# Patient Record
Sex: FEMALE | Race: OTHER | HISPANIC OR LATINO
[De-identification: names, ages, dates, MRNs, and addresses within clinical notes are randomized per-mention and may not be internally consistent; named-entity substitution may affect disease eponyms.]

---

## 2016-12-31 NOTE — H&P ADULT. - ASSESSMENT
72 y/o female with HTN, dyslipidemia, GERD, OA, obesity, admitted for bilateral lower extremity redness/pain/swelling c/w cellulitis

## 2016-12-31 NOTE — H&P ADULT. - PROBLEM SELECTOR PLAN 1
iv clindamycin 600 mg q8h, pt is allergic to penicillin, consider transition to po abx soon; check leg doppler to r/o DVT (less likely)

## 2016-12-31 NOTE — ED PROVIDER NOTE - OBJECTIVE STATEMENT
71f, pmhx htn, dlp. p/w b/l foot swelling started 3 days ago. yesterday, noted both legs were swollen and became red. no c/p, sob, palps. no f/c, n/v. no h/o similar, no h/o chf.  PMD: Dr. Felisa Buenrostro

## 2016-12-31 NOTE — ED ADULT TRIAGE NOTE - CHIEF COMPLAINT QUOTE
pt c/o reddness to both lower legs that started yesterday. with swelling  pt has sore that's starting on lt lower leg

## 2016-12-31 NOTE — H&P ADULT. - PMH
Essential (primary) hypertension    Gastroesophageal reflux disease without esophagitis    Hyperlipidemia, unspecified hyperlipidemia type    Other type of osteoarthritis, unspecified site

## 2016-12-31 NOTE — H&P ADULT. - HISTORY OF PRESENT ILLNESS
70 y/o female with h/o HTN, OA s/p bilateral TKR, dyslipidemia, GERD, who presented with 2 day h/o bilateral lower leg redness and pain and swelling, denies fever/chill/chest pain/sob. She has two little dogs at home, but denies insect/animal bites or trauma.   In the ED, she was given clindamycin (reports allergy to penicillin). Her PCP is Dr. Felisa Buenrostro

## 2016-12-31 NOTE — ED PROVIDER NOTE - MEDICAL DECISION MAKING DETAILS
3 days of pedal edema, with sudden b/l erythema and swelling yesterday. H+P has features of cellulitis as well as stasis edema. will tx for cellulitis. labs, probnp, cxr. will need admission.

## 2016-12-31 NOTE — H&P ADULT. - EXTREMITIES COMMENTS
bilateral lower leg erythema, increased warmth, soft tissue swelling and tenderness to palpation; no drainage

## 2017-01-01 ENCOUNTER — TRANSCRIPTION ENCOUNTER (OUTPATIENT)
Age: 72
End: 2017-01-01

## 2017-01-01 NOTE — DISCHARGE NOTE ADULT - SECONDARY DIAGNOSIS.
Essential (primary) hypertension Hyperlipidemia, unspecified hyperlipidemia type Gastroesophageal reflux disease without esophagitis

## 2017-01-01 NOTE — DISCHARGE NOTE ADULT - PATIENT PORTAL LINK FT
“You can access the FollowHealth Patient Portal, offered by [facility], by registering with the following website: [email or web address]”

## 2017-01-01 NOTE — DISCHARGE NOTE ADULT - HOSPITAL COURSE
72 y/o female with h/o HTN, OA s/p bilateral TKR, dyslipidemia, GERD, who presented with 2 day h/o bilateral lower leg redness and pain and swelling, denies fever/chill/chest pain/sob. She has two little dogs at home, but denies insect/animal bites or trauma    Hospital Course   Cellulitis of lower extremity, unspecified laterality.  -Clindamycin 600 mg q8h   -leg doppler-__________________________  -House ID _____________________________     Essential (primary) hypertension.   -oprol 50 mg while in hospital.     Other type of osteoarthritis, unspecified site.    tylenol prn; h/o bilateral TKR, stable.       Dispo: 70 y/o female with h/o HTN, OA s/p bilateral TKR, dyslipidemia, GERD, who presented with 2 day h/o bilateral lower leg redness and pain and swelling, denies fever/chill/chest pain/sob. She has two little dogs at home, but denies insect/animal bites or trauma    Hospital Course:    Cellulitis of lower extremity, unspecified laterality.  -Clindamycin 600 mg q8h   -leg doppler-__________________________  -House ID-no significant cellulitis, monitor off abx    Essential (primary) hypertension.   -Toprol 50 mg while in hospital.     Other type of osteoarthritis, unspecified site.   Tylenol prn; h/o bilateral TKR, stable.       Dispo: 72 y/o female with h/o HTN, OA s/p bilateral TKR, dyslipidemia, GERD, who presented with 2 day h/o bilateral lower leg redness and pain and swelling, denies fever/chill/chest pain/sob. She has two little dogs at home, but denies insect/animal bites or trauma    Hospital Course:    Cellulitis of lower extremity, unspecified laterality.  -Clindamycin 600 mg q8h   -leg doppler-negative  -House ID-no significant cellulitis, monitor off abx    Essential (primary) hypertension.   -Toprol 50 mg while in hospital.     Other type of osteoarthritis, unspecified site.   Tylenol prn; h/o bilateral TKR, stable.       Dispo: Pt medically stable for discharge home with PCP follow up

## 2017-01-01 NOTE — DISCHARGE NOTE ADULT - MEDICATION SUMMARY - MEDICATIONS TO TAKE
I will START or STAY ON the medications listed below when I get home from the hospital:    acetaminophen 325 mg oral tablet  -- 2 tab(s) by mouth every 6 hours, As needed, Mild Pain (1 - 3) or fever >101F  -- Indication: For PAIN     Zetia 10 mg oral tablet  -- 1 tab(s) by mouth once a day  -- Indication: For HLD    Bystolic 10 mg oral tablet  -- 1 tab(s) by mouth once a day  -- Indication: For HTN    omeprazole 20 mg oral delayed release capsule  -- 1 cap(s) by mouth once a day  -- Indication: For GERD

## 2017-01-01 NOTE — DISCHARGE NOTE ADULT - PLAN OF CARE
Pt will be  free from infection Your blood pressure will be controlled. follow up-- continue blood pressure continue Zetia continue omeprazole follow up with your primary care provider within 1 week of discharge. Call to schedule your appointment continue blood pressure medication as directed

## 2017-01-01 NOTE — DISCHARGE NOTE ADULT - CARE PLAN
Principal Discharge DX:	Cellulitis of lower extremity, unspecified laterality  Goal:	Pt will be  free from infection  Secondary Diagnosis:	Essential (primary) hypertension  Goal:	Your blood pressure will be controlled.  Secondary Diagnosis:	Hyperlipidemia, unspecified hyperlipidemia type  Secondary Diagnosis:	Gastroesophageal reflux disease without esophagitis Principal Discharge DX:	Cellulitis of lower extremity, unspecified laterality  Goal:	Pt will be  free from infection  Instructions for follow-up, activity and diet:	follow up--  Secondary Diagnosis:	Essential (primary) hypertension  Goal:	Your blood pressure will be controlled.  Instructions for follow-up, activity and diet:	continue blood pressure  Secondary Diagnosis:	Hyperlipidemia, unspecified hyperlipidemia type  Instructions for follow-up, activity and diet:	continue Zetia  Secondary Diagnosis:	Gastroesophageal reflux disease without esophagitis  Instructions for follow-up, activity and diet:	continue omeprazole Principal Discharge DX:	Cellulitis of lower extremity, unspecified laterality  Goal:	Pt will be  free from infection  Instructions for follow-up, activity and diet:	follow up with your primary care provider within 1 week of discharge. Call to schedule your appointment  Secondary Diagnosis:	Essential (primary) hypertension  Goal:	Your blood pressure will be controlled.  Instructions for follow-up, activity and diet:	continue blood pressure medication as directed  Secondary Diagnosis:	Hyperlipidemia, unspecified hyperlipidemia type  Instructions for follow-up, activity and diet:	continue Zetia  Secondary Diagnosis:	Gastroesophageal reflux disease without esophagitis  Instructions for follow-up, activity and diet:	continue omeprazole

## 2017-01-01 NOTE — DISCHARGE NOTE ADULT - NS MD DC FALL RISK RISK
For information on Fall & Injury Prevention, visit [email or web address]

## 2017-01-02 NOTE — PROVIDER CONTACT NOTE (OTHER) - ASSESSMENT
Pt previously assessed as being AOx4. Currently, pt noted to walk out of room in confusion. Pt stated, "I woke up and didn't know where I was, what day it is or why I'm here. " Pt states this is the first instance of confusion. Pt reoriented, no s/s of distress noted.

## 2017-01-03 NOTE — PHYSICAL THERAPY INITIAL EVALUATION ADULT - PERTINENT HX OF CURRENT PROBLEM, REHAB EVAL
70 y/o female with HTN, dyslipidemia, GERD, OA, obesity, admitted for bilateral lower extremity redness/pain/swelling c/w cellulitis

## 2017-01-03 NOTE — PHYSICAL THERAPY INITIAL EVALUATION ADULT - RANGE OF MOTION EXAMINATION, REHAB EVAL
bilateral lower extremity ROM was WFL (within functional limits)/bilateral upper extremity ROM was WFL (within functional limits)

## 2017-09-06 ENCOUNTER — EMERGENCY (EMERGENCY)
Facility: HOSPITAL | Age: 72
LOS: 1 days | Discharge: HOME | End: 2017-09-06
Attending: EMERGENCY MEDICINE | Admitting: EMERGENCY MEDICINE
Payer: MEDICARE

## 2017-09-06 VITALS
TEMPERATURE: 98 F | SYSTOLIC BLOOD PRESSURE: 150 MMHG | HEART RATE: 82 BPM | DIASTOLIC BLOOD PRESSURE: 65 MMHG | OXYGEN SATURATION: 100 % | RESPIRATION RATE: 18 BRPM

## 2017-09-06 DIAGNOSIS — Z96.653 PRESENCE OF ARTIFICIAL KNEE JOINT, BILATERAL: Chronic | ICD-10-CM

## 2017-09-06 PROCEDURE — 99284 EMERGENCY DEPT VISIT MOD MDM: CPT

## 2017-09-06 PROCEDURE — 70450 CT HEAD/BRAIN W/O DYE: CPT | Mod: 26

## 2017-09-06 RX ORDER — ACETAMINOPHEN 500 MG
650 TABLET ORAL ONCE
Qty: 0 | Refills: 0 | Status: COMPLETED | OUTPATIENT
Start: 2017-09-06 | End: 2017-09-06

## 2017-09-06 RX ADMIN — Medication 650 MILLIGRAM(S): at 14:16

## 2017-09-06 NOTE — ED PROVIDER NOTE - PLAN OF CARE
Follow up with your PMD within 24-48 hours- show copies of your reports given to you. Take Motrin 400 mg every 6-8 hours with food for pain alternate with Tylenol 650mg every 4-6 hrs as needed for pain.  Follow up with Neurology within the week. Referral list provided.  Worsening or new headache, dizziness, nausea, vomiting, weakness, fever, chills OR ANY NEW CONCERNING SYMPTOMS return to the Emergency Department.

## 2017-09-06 NOTE — ED PROVIDER NOTE - PROGRESS NOTE DETAILS
FERNANDO CASTELAN- CT head reading -old lacunar infarct. Pt states h/o headaches and has previously seen Neurology (cannot recall name). Currently states headache is improved. Denies weakness, numbness, visual change, trouble walking or speaking.  Walks with a cane due to "ankle swelling". Appears well. No neuro deficits on exam. Will DC home with Neuro and PMD follow up- also provided her with Neurology referral list.

## 2017-09-06 NOTE — ED PROVIDER NOTE - OBJECTIVE STATEMENT
73 y/o F w/ PMHx of HTN, GERD, HLD, Sjogren's syndrome and osteoarthritis, presents to the ED c/o worsening HA and left eye pain x1 week. Pt notes similar sx in the past, which resolved by themselves. Endorses use of Motrin prior to visit w/ minimal relief. Pt also reports nausea today and vomiting 3 days ago. Denies numbness/tingling, weakness, CP, SOB, abd pain, visual changes, head trauma or any other complaints.

## 2017-09-06 NOTE — ED PROVIDER NOTE - CARE PLAN
Principal Discharge DX:	Headache  Instructions for follow-up, activity and diet:	Follow up with your PMD within 24-48 hours- show copies of your reports given to you. Take Motrin 400 mg every 6-8 hours with food for pain alternate with Tylenol 650mg every 4-6 hrs as needed for pain.  Follow up with Neurology within the week. Referral list provided.  Worsening or new headache, dizziness, nausea, vomiting, weakness, fever, chills OR ANY NEW CONCERNING SYMPTOMS return to the Emergency Department.

## 2017-09-06 NOTE — ED PROVIDER NOTE - MEDICAL DECISION MAKING DETAILS
71 y/o F c/o HA w/o red flags, not more severe than prior HA. Given age will obtain CT head, Tylenol and likely follow up PCP. Unlikely, temporal arteritis, glaucoma or subarachnoid hemorrhage.

## 2017-09-06 NOTE — ED PROVIDER NOTE - CRANIAL NERVE AND PUPILLARY EXAM
cranial nerves 2-12 intact/central and peripheral vision intact/normal finger to nose/peripheral vision intact

## 2017-09-14 ENCOUNTER — APPOINTMENT (OUTPATIENT)
Dept: INTERNAL MEDICINE | Facility: CLINIC | Age: 72
End: 2017-09-14
Payer: MEDICARE

## 2017-09-14 VITALS
SYSTOLIC BLOOD PRESSURE: 108 MMHG | OXYGEN SATURATION: 97 % | HEART RATE: 93 BPM | HEIGHT: 59 IN | DIASTOLIC BLOOD PRESSURE: 69 MMHG | BODY MASS INDEX: 50 KG/M2 | WEIGHT: 248 LBS

## 2017-09-14 DIAGNOSIS — I10 ESSENTIAL (PRIMARY) HYPERTENSION: ICD-10-CM

## 2017-09-14 DIAGNOSIS — K21.9 GASTRO-ESOPHAGEAL REFLUX DISEASE W/OUT ESOPHAGITIS: ICD-10-CM

## 2017-09-14 PROCEDURE — 99213 OFFICE O/P EST LOW 20 MIN: CPT

## 2017-09-14 RX ORDER — CARVEDILOL 6.25 MG/1
6.25 TABLET, FILM COATED ORAL
Qty: 60 | Refills: 0 | Status: ACTIVE | COMMUNITY
Start: 2017-09-14

## 2017-09-14 RX ORDER — RANITIDINE 300 MG/1
300 TABLET ORAL
Qty: 60 | Refills: 5 | Status: ACTIVE | COMMUNITY
Start: 2017-09-14 | End: 1900-01-01

## 2017-09-14 RX ORDER — FAMOTIDINE 40 MG/1
40 TABLET, FILM COATED ORAL
Qty: 1 | Refills: 5 | Status: DISCONTINUED | COMMUNITY
Start: 2017-09-14 | End: 2017-09-14

## 2018-10-24 NOTE — ED PROVIDER NOTE - GENITOURINARY BLADDER
Reports is followed by GI for Lawrence's Esophagus and GERD has been worse because of food choices.  Is taking Omeprazole.  Discussed trial of Carafate.   non-tender/non-distended

## 2019-09-15 ENCOUNTER — EMERGENCY (EMERGENCY)
Facility: HOSPITAL | Age: 74
LOS: 1 days | Discharge: HOME | End: 2019-09-15
Attending: EMERGENCY MEDICINE | Admitting: EMERGENCY MEDICINE
Payer: MEDICARE

## 2019-09-15 VITALS
SYSTOLIC BLOOD PRESSURE: 169 MMHG | DIASTOLIC BLOOD PRESSURE: 88 MMHG | RESPIRATION RATE: 16 BRPM | HEART RATE: 80 BPM | OXYGEN SATURATION: 97 % | TEMPERATURE: 98 F

## 2019-09-15 VITALS
HEART RATE: 84 BPM | RESPIRATION RATE: 18 BRPM | SYSTOLIC BLOOD PRESSURE: 157 MMHG | OXYGEN SATURATION: 98 % | TEMPERATURE: 98 F | DIASTOLIC BLOOD PRESSURE: 77 MMHG

## 2019-09-15 DIAGNOSIS — Z96.653 PRESENCE OF ARTIFICIAL KNEE JOINT, BILATERAL: Chronic | ICD-10-CM

## 2019-09-15 LAB
ALBUMIN SERPL ELPH-MCNC: 3.7 G/DL — SIGNIFICANT CHANGE UP (ref 3.3–5)
ALP SERPL-CCNC: 67 U/L — SIGNIFICANT CHANGE UP (ref 40–120)
ALT FLD-CCNC: 9 U/L — SIGNIFICANT CHANGE UP (ref 4–33)
ANION GAP SERPL CALC-SCNC: 10 MMO/L — SIGNIFICANT CHANGE UP (ref 7–14)
APPEARANCE UR: SIGNIFICANT CHANGE UP
AST SERPL-CCNC: 13 U/L — SIGNIFICANT CHANGE UP (ref 4–32)
BASOPHILS # BLD AUTO: 0.06 K/UL — SIGNIFICANT CHANGE UP (ref 0–0.2)
BASOPHILS NFR BLD AUTO: 0.8 % — SIGNIFICANT CHANGE UP (ref 0–2)
BILIRUB SERPL-MCNC: 0.2 MG/DL — SIGNIFICANT CHANGE UP (ref 0.2–1.2)
BILIRUB UR-MCNC: NEGATIVE — SIGNIFICANT CHANGE UP
BLOOD UR QL VISUAL: NEGATIVE — SIGNIFICANT CHANGE UP
BUN SERPL-MCNC: 14 MG/DL — SIGNIFICANT CHANGE UP (ref 7–23)
CALCIUM SERPL-MCNC: 8.8 MG/DL — SIGNIFICANT CHANGE UP (ref 8.4–10.5)
CHLORIDE SERPL-SCNC: 104 MMOL/L — SIGNIFICANT CHANGE UP (ref 98–107)
CO2 SERPL-SCNC: 29 MMOL/L — SIGNIFICANT CHANGE UP (ref 22–31)
COLOR SPEC: YELLOW — SIGNIFICANT CHANGE UP
CREAT SERPL-MCNC: 0.69 MG/DL — SIGNIFICANT CHANGE UP (ref 0.5–1.3)
EOSINOPHIL # BLD AUTO: 0.42 K/UL — SIGNIFICANT CHANGE UP (ref 0–0.5)
EOSINOPHIL NFR BLD AUTO: 5.6 % — SIGNIFICANT CHANGE UP (ref 0–6)
GLUCOSE SERPL-MCNC: 99 MG/DL — SIGNIFICANT CHANGE UP (ref 70–99)
GLUCOSE UR-MCNC: NEGATIVE — SIGNIFICANT CHANGE UP
HCT VFR BLD CALC: 35.4 % — SIGNIFICANT CHANGE UP (ref 34.5–45)
HGB BLD-MCNC: 10.8 G/DL — LOW (ref 11.5–15.5)
IMM GRANULOCYTES NFR BLD AUTO: 0.1 % — SIGNIFICANT CHANGE UP (ref 0–1.5)
KETONES UR-MCNC: NEGATIVE — SIGNIFICANT CHANGE UP
LEUKOCYTE ESTERASE UR-ACNC: SIGNIFICANT CHANGE UP
LYMPHOCYTES # BLD AUTO: 2.57 K/UL — SIGNIFICANT CHANGE UP (ref 1–3.3)
LYMPHOCYTES # BLD AUTO: 34.1 % — SIGNIFICANT CHANGE UP (ref 13–44)
MCHC RBC-ENTMCNC: 26.5 PG — LOW (ref 27–34)
MCHC RBC-ENTMCNC: 30.5 % — LOW (ref 32–36)
MCV RBC AUTO: 87 FL — SIGNIFICANT CHANGE UP (ref 80–100)
MONOCYTES # BLD AUTO: 0.66 K/UL — SIGNIFICANT CHANGE UP (ref 0–0.9)
MONOCYTES NFR BLD AUTO: 8.8 % — SIGNIFICANT CHANGE UP (ref 2–14)
NEUTROPHILS # BLD AUTO: 3.82 K/UL — SIGNIFICANT CHANGE UP (ref 1.8–7.4)
NEUTROPHILS NFR BLD AUTO: 50.6 % — SIGNIFICANT CHANGE UP (ref 43–77)
NITRITE UR-MCNC: NEGATIVE — SIGNIFICANT CHANGE UP
NRBC # FLD: 0 K/UL — SIGNIFICANT CHANGE UP (ref 0–0)
PH UR: 8 — SIGNIFICANT CHANGE UP (ref 5–8)
PLATELET # BLD AUTO: 344 K/UL — SIGNIFICANT CHANGE UP (ref 150–400)
PMV BLD: 9.5 FL — SIGNIFICANT CHANGE UP (ref 7–13)
POTASSIUM SERPL-MCNC: 3.8 MMOL/L — SIGNIFICANT CHANGE UP (ref 3.5–5.3)
POTASSIUM SERPL-SCNC: 3.8 MMOL/L — SIGNIFICANT CHANGE UP (ref 3.5–5.3)
PROT SERPL-MCNC: 6.3 G/DL — SIGNIFICANT CHANGE UP (ref 6–8.3)
PROT UR-MCNC: SIGNIFICANT CHANGE UP
RBC # BLD: 4.07 M/UL — SIGNIFICANT CHANGE UP (ref 3.8–5.2)
RBC # FLD: 15.2 % — HIGH (ref 10.3–14.5)
SODIUM SERPL-SCNC: 143 MMOL/L — SIGNIFICANT CHANGE UP (ref 135–145)
SP GR SPEC: 1.02 — SIGNIFICANT CHANGE UP (ref 1–1.04)
SQUAMOUS # UR AUTO: SIGNIFICANT CHANGE UP
UROBILINOGEN FLD QL: NORMAL — SIGNIFICANT CHANGE UP
WBC # BLD: 7.54 K/UL — SIGNIFICANT CHANGE UP (ref 3.8–10.5)
WBC # FLD AUTO: 7.54 K/UL — SIGNIFICANT CHANGE UP (ref 3.8–10.5)
WBC UR QL: SIGNIFICANT CHANGE UP (ref 0–?)

## 2019-09-15 PROCEDURE — 99284 EMERGENCY DEPT VISIT MOD MDM: CPT | Mod: GC

## 2019-09-15 PROCEDURE — 72100 X-RAY EXAM L-S SPINE 2/3 VWS: CPT | Mod: 26

## 2019-09-15 PROCEDURE — 70450 CT HEAD/BRAIN W/O DYE: CPT | Mod: 26

## 2019-09-15 RX ORDER — METOCLOPRAMIDE HCL 10 MG
10 TABLET ORAL ONCE
Refills: 0 | Status: COMPLETED | OUTPATIENT
Start: 2019-09-15 | End: 2019-09-15

## 2019-09-15 RX ADMIN — Medication 10 MILLIGRAM(S): at 12:31

## 2019-09-15 NOTE — ED ADULT NURSE NOTE - NSIMPLEMENTINTERV_GEN_ALL_ED
Implemented All Fall with Harm Risk Interventions:  [place] to call system. Call bell, personal items and telephone within reach. Instruct patient to call for assistance. Room bathroom lighting operational. Non-slip footwear when patient is off stretcher. Physically safe environment: no spills, clutter or unnecessary equipment. Stretcher in lowest position, wheels locked, appropriate side rails in place. Provide visual cue, wrist band, yellow gown, etc. Monitor gait and stability. Monitor for mental status changes and reorient to person, place, and time. Review medications for side effects contributing to fall risk. Reinforce activity limits and safety measures with patient and family. Provide visual clues: red socks.

## 2019-09-15 NOTE — CONSULT NOTE ADULT - SUBJECTIVE AND OBJECTIVE BOX
MRN-[id number]  Patient is a 74y old  Female who presents with a chief complaint of   HPI:    75yo F pmhx of posterior stroke (no residual deficits), HTN, HLD, dementia and headaches, BIBEMS for headache. Pt lives with sister.  At 2 am on morning of exam the patient awoke with headache and vomited, headache continued through the morning and sister brought patient to ED.  In ED the patient received reglan and no pain meds.  Patient denies LOC, lightheadedness, vertigo, SOB, CP, d/c, urinary sxs.    At time of interview the headache resolved and the patient had no complaints.      PAST MEDICAL & SURGICAL HISTORY:  Other type of osteoarthritis, unspecified site  Gastroesophageal reflux disease without esophagitis  Hyperlipidemia, unspecified hyperlipidemia type  Essential (primary) hypertension  History of knee replacement, total, bilateral    FAMILY HISTORY:  Family history of cancer (Aunt): one maternal aunt had pancreatic cancer and another maternal aunt had colon cancer    Social Hx:  Nonsmoker, no drug or alcohol use    Home Medications:  acetaminophen 325 mg oral tablet: 2 tab(s) orally every 6 hours, As needed, Mild Pain (1 - 3) or fever &gt;101F (2017 13:32)  Bystolic 10 mg oral tablet: 1 tab(s) orally once a day (31 Dec 2016 14:25)  omeprazole 20 mg oral delayed release capsule: 1 cap(s) orally once a day (31 Dec 2016 14:25)  Zetia 10 mg oral tablet: 1 tab(s) orally once a day (31 Dec 2016 14:25)    MEDICATIONS  (STANDING):    MEDICATIONS  (PRN):    Allergies  penicillin (Rash)    Intolerances      REVIEW OF SYSTEMS      ROS: Pertinent positives in HPI, all other ROS were reviewed and are negative.      Vital Signs Last 24 Hrs  T(C): 36.7 (15 Sep 2019 15:38), Max: 36.7 (15 Sep 2019 15:38)  T(F): 98 (15 Sep 2019 15:38), Max: 98 (15 Sep 2019 15:38)  HR: 84 (15 Sep 2019 15:38) (80 - 84)  BP: 157/77 (15 Sep 2019 15:38) (157/77 - 169/88)  BP(mean): --  RR: 18 (15 Sep 2019 15:38) (16 - 18)  SpO2: 98% (15 Sep 2019 15:38) (97% - 98%)    GENERAL EXAM:    NEUROLOGICAL EXAM:  MS: AAOX2 (not time), fluent, attends b/l; recent and remote memory intact; normal attention, language and fund of knowledge.   CN: VFF, EOMI, PERRL, V1-3 intact, no facial asymmetry, t/p midline, SCM/trap intact.  Motor: Strength: 5/5 4x. Tone: normal. Bulk: normal. DTR 2+ symm.  Plantar flex b/l. Sensation: intact to LT/PP/Vibration/Position/Temperature 4x.   Coordination: intact 4x.   Gait:  normal gait, walks without assistance, although normally uses cane per the sister    Labs:   cbc                      10.8   7.54  )-----------( 344      ( 15 Sep 2019 12:30 )             35.4     [id number]    143  |  104  |  14  ----------------------------<  99  3.8   |  29  |  0.69    Ca    8.8      15 Sep 2019 12:30    TPro  6.3  /  Alb  3.7  /  TBili  0.2  /  DBili  x   /  AST  13  /  ALT  9   /  AlkPhos  67  09-15    Coags  Lipids  A1C  CardiacMarkers    LFTsLIVER FUNCTIONS - ( 15 Sep 2019 12:30 )  Alb: 3.7 g/dL / Pro: 6.3 g/dL / ALK PHOS: 67 u/L / ALT: 9 u/L / AST: 13 u/L / GGT: x           UAUrinalysis Basic - ( 15 Sep 2019 14:15 )    Color: YELLOW / Appearance: TURBID / S.019 / pH: 8.0  Gluc: NEGATIVE / Ketone: NEGATIVE  / Bili: NEGATIVE / Urobili: NORMAL   Blood: NEGATIVE / Protein: SMALL / Nitrite: NEGATIVE   Leuk Esterase: TRACE / RBC: x / WBC 0-2   Sq Epi: OCC / Non Sq Epi: x / Bacteria: x

## 2019-09-15 NOTE — ED PROVIDER NOTE - NS ED ROS FT
GENERAL: No fever, chills  EYES: no vision changes, no discharge.   HEENT: no difficulty swallowing or speaking   CARDIAC: no chest pain/pressure, SOB, lower ex edema  PULMONARY: no cough, SOB  GI: no abdominal pain, + nausea, + vomiting.   : no dysuria, frequency, hematuria  SKIN: no rashes, lesions, vesicles  NEURO: + headache, no lightheadedness, paraesthesias.   MSK: No joint pain, myalgia, weakness.

## 2019-09-15 NOTE — ED ADULT NURSE NOTE - OBJECTIVE STATEMENT
c/o occipital headache that began this morning, denies n/v/d cp, blurry vision, abdomen soft and non tender, lungs clear, no swelling noted to lower legs, no recent falls or trauma to head, sister at bedside, pt ambulates with a cane, safety maintained

## 2019-09-15 NOTE — ED PROVIDER NOTE - PHYSICAL EXAMINATION
General: Patient awake alert NAD.   HEENT: normocephalic, atraumatic, EOMI, no scleral icterus.   Cardiac: RRR, S1, S2, no murmur, rubs, gallop.   LUNGS: CTA B/L no wheeze, rhonchi, rales.   Abdomen: soft NT, ND, no rebound no guarding.  EXT: Moving all extremities, no edema.    Neuro: A&Ox3 (self, place, knows year only), CN 2-12 grossly intact, no focal   Skin: warm, dry, no rash, no lesions

## 2019-09-15 NOTE — ED PROVIDER NOTE - PATIENT PORTAL LINK FT
You can access the FollowMyHealth Patient Portal offered by [facility] by registering at the following website: [email or web address]. By joining [employer]’s FollowMyHealth portal, you will also be able to view your health information using other applications (apps) compatible with our system.

## 2019-09-15 NOTE — CONSULT NOTE ADULT - ASSESSMENT
75yo F pmhx of posterior stroke (no residual deficits), HTN, HLD, dementia and headaches, BIBEMS for headache. Pt lives with sister.  At 2 am on morning of exam the patient awoke with headache and vomited, headache continued through the morning and sister brought patient to ED.  In ED the patient received reglan and no pain meds.  Patient denies LOC, lightheadedness, vertigo, SOB, CP, d/c, urinary sxs.    Impression:  resolved headache  - return to ED if sxs return   - follow up with PCP

## 2019-09-15 NOTE — ED PROVIDER NOTE - PROGRESS NOTE DETAILS
Daija Padilla M.D. Resident  Neurology will see patient Daija Padilla M.D. Resident  patient reassessed, headache resolved, ambulating by self with walker with no unsteadiness, tolerating PO. Sister at bedside endorse patient appears much better. Daija Padilla M.D. Resident  Neuro does nto recommend MR, given resolved HA, no neurological findings. Pt and sister agreeable to plan.

## 2019-09-15 NOTE — ED ADULT TRIAGE NOTE - CHIEF COMPLAINT QUOTE
Pt's sister states that pt has been complaining of pain to the back of her head since yesterday, states that she was crying as a result this morning and would not take her medications.  Pt states that her back hurts.  PMH HTN, HLD, CVA, dementia

## 2019-09-15 NOTE — ED PROVIDER NOTE - OBJECTIVE STATEMENT
73yo F pmhx of posterior stroke (no residual deficits), HTN, HLD, dementia, BIBEMS for headache. Pt lives with sister who is at bedside. Sister notes pt woke up from sleep and had 1 episode of vomiting at 1AM. At 9AM, pt had episode of occpital headache that was so severe pt was crying and refusing to take her daily medications. At around the same time, Pt's sister also saw her to be more wobbly on her feet, holding on to furniture as she moved around the house. Pt and sister denies chest pain, sob, continued nausea or vomiting, focal neurological deficits. 75yo F pmhx of posterior stroke (no residual deficits), HTN, HLD, dementia, BIBEMS for headache. Pt lives with sister who is at bedside. Sister notes pt woke up from sleep and had 1 episode of vomiting at 1AM. At 9AM, pt had episode of occpital headache that was so severe pt was crying and refusing to take her daily medications. At around the same time, Pt's sister also saw her to be more wobbly on her feet, holding on to furniture as she moved around the house. Pt and sister denies chest pain, sob, continued nausea or vomiting, focal neurological deficits.    Attendinyo female presents with headache and nausea this AM associated with difficulty walking.  pt has had this many times before. here, pt states she feels well.

## 2019-09-15 NOTE — ED PROVIDER NOTE - NSFOLLOWUPINSTRUCTIONS_ED_ALL_ED_FT
You were seen in the Emergency Department (ED) for headache, vomiting. Your workup in the ED, including head CT, was negative for acute conditions.     Please take over the counter Tylenol as directed by the packaging for your pain.   Please follow up with your primary care doctor in the next 72 hours.    Please return to the ED if you experience any new or concerning symptoms, such as: worsening headache, changes in your vision, dizziness, chest pain, difficulty breathing, passing out, unable to eat or drink, unable to move or feel part of your body, fever, chills.     Thank you for choosing a [facility] ED.

## 2019-09-15 NOTE — ED PROVIDER NOTE - CLINICAL SUMMARY MEDICAL DECISION MAKING FREE TEXT BOX
75 yo F with HTN, HLD, CVA, dementia, pw headache, unsteady gait, vomiting. No focal neuro findings on exam, but concerning for stroke given no symptoms for last posterior stroke. CT head, labs, neuro consult, possible MRI. Reassess for dispo.

## 2020-03-18 NOTE — ED ADULT NURSE NOTE - TEMPLATE LIST FOR HEAD TO TOE ASSESSMENT
Wife states she would like  to get tested, states he has been feeling extremely fatigued, has a headache and chills no fever.  Please call back General

## 2020-12-04 NOTE — ED ADULT NURSE NOTE - NSFALLRSKINDICTYPE_ED_ALL_ED
Called Saad to let him know that his paperwork is ready to be picked up. Left paperwork at IM . He will pick it up today before 5 pm and look to make sure everything is filled out properly. Denies further questions or concerns at this time.   Need for Mobility Assisted Device

## 2021-01-01 NOTE — ED PROVIDER NOTE - CHIEF COMPLAINT
Report received from [facility] RN, care assumed. The patient is a 74y Female complaining of headache

## 2021-05-05 ENCOUNTER — INPATIENT (INPATIENT)
Facility: HOSPITAL | Age: 76
LOS: 6 days | Discharge: NURSING, REHAB OR LONG-TERM CARE | DRG: 303 | End: 2021-05-12
Attending: INTERNAL MEDICINE | Admitting: STUDENT IN AN ORGANIZED HEALTH CARE EDUCATION/TRAINING PROGRAM
Payer: MEDICARE

## 2021-05-05 VITALS
HEART RATE: 85 BPM | RESPIRATION RATE: 18 BRPM | HEIGHT: 67 IN | TEMPERATURE: 98 F | SYSTOLIC BLOOD PRESSURE: 170 MMHG | OXYGEN SATURATION: 95 % | DIASTOLIC BLOOD PRESSURE: 90 MMHG | WEIGHT: 179.9 LBS

## 2021-05-05 DIAGNOSIS — Z96.653 PRESENCE OF ARTIFICIAL KNEE JOINT, BILATERAL: Chronic | ICD-10-CM
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
## 2021-05-05 LAB
ALBUMIN SERPL ELPH-MCNC: 3.8 G/DL — SIGNIFICANT CHANGE UP (ref 3.3–5)
ALP SERPL-CCNC: 88 U/L — SIGNIFICANT CHANGE UP (ref 40–120)
ALT FLD-CCNC: 15 U/L — SIGNIFICANT CHANGE UP (ref 10–45)
ANION GAP SERPL CALC-SCNC: 13 MMOL/L — SIGNIFICANT CHANGE UP (ref 5–17)
AST SERPL-CCNC: 21 U/L — SIGNIFICANT CHANGE UP (ref 10–40)
BASOPHILS # BLD AUTO: 0.06 K/UL — SIGNIFICANT CHANGE UP (ref 0–0.2)
BASOPHILS NFR BLD AUTO: 0.6 % — SIGNIFICANT CHANGE UP (ref 0–2)
BILIRUB SERPL-MCNC: 0.2 MG/DL — SIGNIFICANT CHANGE UP (ref 0.2–1.2)
BUN SERPL-MCNC: 22 MG/DL — SIGNIFICANT CHANGE UP (ref 7–23)
CALCIUM SERPL-MCNC: 8.7 MG/DL — SIGNIFICANT CHANGE UP (ref 8.4–10.5)
CHLORIDE SERPL-SCNC: 103 MMOL/L — SIGNIFICANT CHANGE UP (ref 96–108)
CO2 SERPL-SCNC: 22 MMOL/L — SIGNIFICANT CHANGE UP (ref 22–31)
CREAT SERPL-MCNC: 0.97 MG/DL — SIGNIFICANT CHANGE UP (ref 0.5–1.3)
EOSINOPHIL # BLD AUTO: 0.32 K/UL — SIGNIFICANT CHANGE UP (ref 0–0.5)
EOSINOPHIL NFR BLD AUTO: 3.2 % — SIGNIFICANT CHANGE UP (ref 0–6)
GLUCOSE SERPL-MCNC: 123 MG/DL — HIGH (ref 70–99)
HCT VFR BLD CALC: 33.1 % — LOW (ref 34.5–45)
HGB BLD-MCNC: 10 G/DL — LOW (ref 11.5–15.5)
IMM GRANULOCYTES NFR BLD AUTO: 0.5 % — SIGNIFICANT CHANGE UP (ref 0–1.5)
INR BLD: 1.08 RATIO — SIGNIFICANT CHANGE UP (ref 0.88–1.16)
LYMPHOCYTES # BLD AUTO: 1.35 K/UL — SIGNIFICANT CHANGE UP (ref 1–3.3)
LYMPHOCYTES # BLD AUTO: 13.6 % — SIGNIFICANT CHANGE UP (ref 13–44)
MCHC RBC-ENTMCNC: 24.3 PG — LOW (ref 27–34)
MCHC RBC-ENTMCNC: 30.2 GM/DL — LOW (ref 32–36)
MCV RBC AUTO: 80.5 FL — SIGNIFICANT CHANGE UP (ref 80–100)
MONOCYTES # BLD AUTO: 0.73 K/UL — SIGNIFICANT CHANGE UP (ref 0–0.9)
MONOCYTES NFR BLD AUTO: 7.3 % — SIGNIFICANT CHANGE UP (ref 2–14)
NEUTROPHILS # BLD AUTO: 7.43 K/UL — HIGH (ref 1.8–7.4)
NEUTROPHILS NFR BLD AUTO: 74.8 % — SIGNIFICANT CHANGE UP (ref 43–77)
NRBC # BLD: 0 /100 WBCS — SIGNIFICANT CHANGE UP (ref 0–0)
PLATELET # BLD AUTO: 318 K/UL — SIGNIFICANT CHANGE UP (ref 150–400)
POTASSIUM SERPL-MCNC: 4.6 MMOL/L — SIGNIFICANT CHANGE UP (ref 3.5–5.3)
POTASSIUM SERPL-SCNC: 4.6 MMOL/L — SIGNIFICANT CHANGE UP (ref 3.5–5.3)
PROT SERPL-MCNC: 6.7 G/DL — SIGNIFICANT CHANGE UP (ref 6–8.3)
PROTHROM AB SERPL-ACNC: 12.9 SEC — SIGNIFICANT CHANGE UP (ref 10.6–13.6)
RBC # BLD: 4.11 M/UL — SIGNIFICANT CHANGE UP (ref 3.8–5.2)
RBC # FLD: 15.8 % — HIGH (ref 10.3–14.5)
SARS-COV-2 RNA SPEC QL NAA+PROBE: SIGNIFICANT CHANGE UP
SODIUM SERPL-SCNC: 138 MMOL/L — SIGNIFICANT CHANGE UP (ref 135–145)
WBC # BLD: 9.94 K/UL — SIGNIFICANT CHANGE UP (ref 3.8–10.5)
WBC # FLD AUTO: 9.94 K/UL — SIGNIFICANT CHANGE UP (ref 3.8–10.5)

## 2021-05-05 PROCEDURE — 93010 ELECTROCARDIOGRAM REPORT: CPT | Mod: GC

## 2021-05-05 PROCEDURE — 74177 CT ABD & PELVIS W/CONTRAST: CPT | Mod: 26,MA

## 2021-05-05 PROCEDURE — 73590 X-RAY EXAM OF LOWER LEG: CPT | Mod: 26,50

## 2021-05-05 PROCEDURE — 99223 1ST HOSP IP/OBS HIGH 75: CPT

## 2021-05-05 PROCEDURE — 99285 EMERGENCY DEPT VISIT HI MDM: CPT | Mod: GC

## 2021-05-05 PROCEDURE — 70486 CT MAXILLOFACIAL W/O DYE: CPT | Mod: 26,MA

## 2021-05-05 PROCEDURE — 71045 X-RAY EXAM CHEST 1 VIEW: CPT | Mod: 26

## 2021-05-05 PROCEDURE — 70450 CT HEAD/BRAIN W/O DYE: CPT | Mod: 26,MH

## 2021-05-05 PROCEDURE — 72131 CT LUMBAR SPINE W/O DYE: CPT | Mod: 26,MA

## 2021-05-05 PROCEDURE — 72125 CT NECK SPINE W/O DYE: CPT | Mod: 26,MH

## 2021-05-05 RX ORDER — ACETAMINOPHEN 500 MG
975 TABLET ORAL ONCE
Refills: 0 | Status: COMPLETED | OUTPATIENT
Start: 2021-05-05 | End: 2021-05-05

## 2021-05-05 RX ORDER — BACITRACIN ZINC 500 UNIT/G
1 OINTMENT IN PACKET (EA) TOPICAL ONCE
Refills: 0 | Status: COMPLETED | OUTPATIENT
Start: 2021-05-05 | End: 2021-05-05

## 2021-05-05 RX ORDER — FUROSEMIDE 40 MG
20 TABLET ORAL ONCE
Refills: 0 | Status: COMPLETED | OUTPATIENT
Start: 2021-05-05 | End: 2021-05-05

## 2021-05-05 RX ORDER — ACETAMINOPHEN 500 MG
650 TABLET ORAL EVERY 6 HOURS
Refills: 0 | Status: DISCONTINUED | OUTPATIENT
Start: 2021-05-05 | End: 2021-05-12

## 2021-05-05 RX ORDER — CEFAZOLIN SODIUM 1 G
2000 VIAL (EA) INJECTION EVERY 8 HOURS
Refills: 0 | Status: DISCONTINUED | OUTPATIENT
Start: 2021-05-06 | End: 2021-05-06

## 2021-05-05 RX ORDER — CEFAZOLIN SODIUM 1 G
2000 VIAL (EA) INJECTION ONCE
Refills: 0 | Status: COMPLETED | OUTPATIENT
Start: 2021-05-05 | End: 2021-05-05

## 2021-05-05 RX ORDER — ACETAMINOPHEN 500 MG
650 TABLET ORAL ONCE
Refills: 0 | Status: COMPLETED | OUTPATIENT
Start: 2021-05-05 | End: 2021-05-05

## 2021-05-05 RX ORDER — CEFAZOLIN SODIUM 1 G
VIAL (EA) INJECTION
Refills: 0 | Status: DISCONTINUED | OUTPATIENT
Start: 2021-05-05 | End: 2021-05-06

## 2021-05-05 RX ADMIN — Medication 975 MILLIGRAM(S): at 22:34

## 2021-05-05 RX ADMIN — Medication 1 APPLICATION(S): at 16:21

## 2021-05-05 RX ADMIN — Medication 20 MILLIGRAM(S): at 21:43

## 2021-05-05 RX ADMIN — Medication 100 MILLIGRAM(S): at 21:39

## 2021-05-05 RX ADMIN — Medication 650 MILLIGRAM(S): at 16:21

## 2021-05-05 NOTE — H&P ADULT - PROBLEM SELECTOR PLAN 7
See above.  Will continue with IV Lasix 20 mg daily for now.  Echo.  Daily weights.  Troponin assay.   Would consider formal evaluation by his cardiologist in the AM.

## 2021-05-05 NOTE — ED ADULT NURSE REASSESSMENT NOTE - NS ED NURSE REASSESS COMMENT FT1
Pt. resting comfortably in stretcher in no acute distress, VSS. Awaiting CT results. Pt. with sister at bedside. Pt. oriented to person, place, but is confused to events and forgot she had a fall today. Family member reports this is normal for her. Reporting mild headache but denies any other complaints at this time. Will continue to reassess.

## 2021-05-05 NOTE — ED PROVIDER NOTE - PHYSICAL EXAMINATION
PHYSICAL EXAM:    GENERAL: lying in bed comfortably  HEAD:  Large bump to right side of forehead  EYES: EOMI, PERRLA, conjunctiva and sclera clear  ENT: has bleeding from superficial laceration 1 cm on nasal bridge. No active bleed from anterior nares. No erythema/pallor/petechiae/lesions otherwise  NECK: Supple  LUNG: CTA b/l; no r/r/w  HEART: RRR, +S1/S2; No m/r/g  ABDOMEN: soft, NT/ND; BS audible   EXTREMITIES:  B/L LE pitting below knee redness/edema. No tenderness   NERVOUS SYSTEM:  AAOx3, speech clear. No sensory/motor deficits   SKIN: No new rashes or lesions

## 2021-05-05 NOTE — ED PROVIDER NOTE - ATTENDING CONTRIBUTION TO CARE
Patient is a 76 yo F with history of dementia, HTN, hyperlipidemia brought in for evaluation of fall and lower extremity swelling and redness. Patient's sister is at bedside, providing history. Accordingly, patient has been on a course of antibiotics for lower extremity cellulitis (unable to name) and was at her pcp's office today when she fell while walking and hit her face. No loc. Per sister, patient has been falling frequently.     VS noted  Gen. no acute distress, Non toxic   HEENT: EOMI, mmm, superficial laceration 1 cm on nasal bridge. left nare with blood, no septal hematoma  Lungs: CTAB/L no C/ W /R   CVS: RRR   Abd; Soft non tender, non distended   Ext: b/l LE erythema, warmth w/o tenderness  Skin: no rash  Neuro AAOx3 non focal clear speech  a/p: s/p mechanical fall - with obvious trauma to face - plan for ct head, ct c-spine, ct abd/pelvis, labs. Plan for XR of b/l LE. Concern for multiple falls.   - Trent ROBERTS

## 2021-05-05 NOTE — H&P ADULT - NSHPLABSRESULTS_GEN_ALL_CORE
WBC 9.9  normal differential.    Hgb 10.0    Platelets of 318K    INR 1.0    Random glucose of 123.    Cr 0.9    K+ 4.6    COVID-19 PCR>>negative.    Chest radiograph reviewed with B/L pulmonary venous congestion.    B/L radiographs tib/fib with no fracture.    CTT head and cervical with no fracture, no intracranial hemorrhage.  OLD LEFT cerebellar lacunar infarct.  RIGHT frontal scalp hematoma.  Maxillofacial CTT with acute nondisplaced fracture LEFT nasal bone with LEFT to right deviation of the nasal septum and questionable area of additional fracture nasal septum.  RIGHT frontal scalp swelling. WBC 9.9  normal differential.    Hgb 10.0    Platelets of 318K    INR 1.0    Random glucose of 123.    Cr 0.9    K+ 4.6    COVID-19 PCR>>negative.    Chest radiograph reviewed with B/L pulmonary venous congestion.    B/L radiographs tib/fib with no fracture.    CTT head and cervical with no fracture, no intracranial hemorrhage.  OLD LEFT cerebellar lacunar infarct.  RIGHT frontal scalp hematoma.  Maxillofacial CTT with acute nondisplaced fracture LEFT nasal bone with LEFT to right deviation of the nasal septum and questionable area of additional fracture nasal septum.  RIGHT frontal scalp swelling.    CTT abdomen /pelvis nondiagnostic.    CTT LS spine with DJD but no fractures. WBC 9.9  normal differential.    Hgb 10.0    Platelets of 318K    INR 1.0    Random glucose of 123.    Cr 0.9    K+ 4.6    COVID-19 PCR>>negative.    Chest radiograph reviewed with B/L pulmonary venous congestion.    B/L radiographs tib/fib with no fracture.    CTT head and cervical with no fracture, no intracranial hemorrhage.  OLD LEFT cerebellar lacunar infarct.  RIGHT frontal scalp hematoma.  Maxillofacial CTT with acute nondisplaced fracture LEFT nasal bone with LEFT to right deviation of the nasal septum and questionable area of additional fracture nasal septum.  RIGHT frontal scalp swelling.    CTT abdomen /pelvis nondiagnostic.    CTT LS spine with DJD but no fractures.    EKG tracing reviewed with NSR at 88 with incomplete RBBB.

## 2021-05-05 NOTE — H&P ADULT - PROBLEM SELECTOR PLAN 1
See above.  IV antibiotics upgraded to IV Zosyn and IV Vancomycin.   Would consider formal ID opinion in the AM.  Venous Duplex ordered.

## 2021-05-05 NOTE — H&P ADULT - NSHPREVIEWOFSYSTEMS_GEN_ALL_CORE
ROS from patient and from sister by phone:    No fever, no chills, no rigors.  NO HA, no focal weakness.  No chest pain/pressure.  NO palpitations.  NO abdominal pain, no red blood per rectum or melena.  NO back pain, no tearing back pain.    No joint pain.  NO dysuria, no hematuria.  NO anorexia. ROS from patient and from sister by phone:    No fever, no chills, no rigors.  NO HA, no focal weakness.  No chest pain/pressure.  NO palpitations.  NO abdominal pain, no red blood per rectum or melena.  NO back pain, no tearing back pain.    No joint pain.  NO dysuria, no hematuria.  NO anorexia.  NO SI/HI.  NO thyroid symptoms.  NO breast symptoms.  NO vaginal bleeding.

## 2021-05-05 NOTE — H&P ADULT - NSHPOUTPATIENTPROVIDERS_GEN_ALL_CORE
Morenita Link DO (PCP-- [facility] )  [phone] Morenita Link DO (PCP-- [facility] )  [phone]  Remington Ireland MD (Cardiology- [facility]) [phone]

## 2021-05-05 NOTE — ED ADULT NURSE NOTE - OBJECTIVE STATEMENT
74 y/o female BIBEMS presenting to ED for fall today, on aspirin. Per EMS, pt was at outpatient office when she had a witnessed fall. Per sister at bedside, pt slipped on tile floor in office and fell forward onto her face, unable to get up. Sister states that pt has been having more difficulty getting around at home d/t swelling to bilateral swollen legs, on abx for possible cellulitis to legs. Sister is concerned about sister's safety as pt has had multiple falls at home. pt endorses generalized back pain at this time. Upon exam pt A&Ox3 gross neuro intact,  no difficulty speaking in complete sentences, s1s2 heart sounds heard, pulses x 4, suarez x4, abdomen soft nontender distended, blood noted to be coming from nares, 1/2 cm laceration to nose bridge, minimal bleeding at this time. pt denies chest pain, sob, ha, n/v/d, abdominal pain, f/c, urinary symptoms, hematuria. Pt arrived with 20 gauge IV to left hand from EMS, received approx 200 mL NS from EMS. 76 y/o female BIBEMS presenting to ED for fall today, on aspirin. Per EMS, pt was at outpatient office when she had a witnessed fall. Per sister at bedside, pt slipped on tile floor in office and fell forward onto her face, unable to get up. Sister states that pt has been having more difficulty getting around at home d/t swelling to bilateral swollen legs, on abx for possible cellulitis to legs. Sister is concerned about sister's safety as pt has had multiple falls at home. pt endorses generalized back pain at this time. Upon exam pt A&Ox3 gross neuro intact,  no difficulty speaking in complete sentences, s1s2 heart sounds heard, pulses x 4, suarez x4, abdomen soft nontender distended, blood noted to be coming from nares, 1/2 cm laceration to nose bridge, minimal bleeding at this time. pt denies chest pain, sob, ha, n/v/d, abdominal pain, f/c, urinary symptoms, hematuria. Pt arrived with 20 gauge IV to left hand from EMS, received approx 200 mL NS from EMS. C-collar removed by MD Vincent at bedside. 76 y/o female BIBEMS presenting to ED for fall today, on aspirin. Per EMS, pt was at outpatient office when she had a witnessed fall. Per sister at bedside, pt slipped on tile floor in office and fell forward onto her face, unable to get up. Sister states that pt has been having more difficulty getting around at home d/t swelling to bilateral swollen legs, on abx for possible cellulitis to legs. Sister is concerned about sister's safety as pt has had multiple falls at home. pt endorses generalized back pain at this time. Upon exam pt A&Ox3 gross neuro intact,  no difficulty speaking in complete sentences, s1s2 heart sounds heard, pulses x 4, suarez x4, abdomen soft nontender distended, blood noted to be coming from nares, 1/2 cm laceration to nose bridge, minimal bleeding at this time. Ecchymosis and swelling noted to right forehead, and periorbitaly. pt denies chest pain, sob, ha, n/v/d, abdominal pain, f/c, urinary symptoms, hematuria. Pt arrived with 20 gauge IV to left hand from EMS, received approx 200 mL NS from EMS. C-collar removed by MD Vincent at bedside.

## 2021-05-05 NOTE — H&P ADULT - NSICDXPASTMEDICALHX_GEN_ALL_CORE_FT
PAST MEDICAL HISTORY:  Dementia with behavioral disturbance, unspecified dementia type     Hyperlipidemia, unspecified hyperlipidemia type     Hypertension, unspecified type     Obesity, unspecified classification, unspecified obesity type, unspecified whether serious comorbidity present

## 2021-05-05 NOTE — ED PROVIDER NOTE - NS ED ROS FT
CONSTITUTIONAL: No weakness, fevers or chills  EYES/ENT: No visual or hearing changes;  No vertigo or throat pain   NECK: No pain   RESPIRATORY: No cough, shortness of breath  CARDIOVASCULAR: No chest pain, palpitations  GASTROINTESTINAL: No abdominal pain. No nausea, vomiting, or hematemesis; No diarrhea or constipation.   GENITOURINARY: No dysuria, frequency  NEUROLOGICAL: No numbness or weakness  SKIN: No new rashes, or lesions     All other review of systems is negative unless indicated above.

## 2021-05-05 NOTE — H&P ADULT - HISTORY OF PRESENT ILLNESS
NIGHT HOSPITALIST:   Patient UNKNOWN to me previously, assigned to me at this point via the ER to admit this 75 y /o F--followed by her office physicians above--patient with a history of moderate cognitive impairment maintained on Namenda--essential HTN, hyperlipidemia, obesity apparently being treated by her PCP for presumed B/L cellulitis with oral antibiotics (Bactrim?) with patient with a presumed mechanical fall upon her face, with a nasal fracture NIGHT HOSPITALIST:   Patient UNKNOWN to me previously, assigned to me at this point via the ER to admit this 75 y /o F--followed by her office physicians above--patient with a history of moderate cognitive impairment maintained on Namenda--essential HTN, hyperlipidemia, obesity apparently being treated by her PCP for presumed B/L cellulitis with oral antibiotics (Bactrim?) with patient with a presumed mechanical fall upon her face, with a nasal fracture and RIGHT scalp contusion.  Sister reports patient with poor functional capacity with frequent falls with a reported nondiagnostic workup by a neurologist.  Patient presently offers no complaint. NIGHT HOSPITALIST:   Patient UNKNOWN to me previously, assigned to me at this point via the ER to admit this 75 y /o F--followed by her office physicians above--patient with a history of moderate cognitive impairment maintained on Namenda--essential HTN, hyperlipidemia, obesity,  apparently being treated by her PCP for presumed B/L cellulitis with oral antibiotics (Bactrim?) with patient with a presumed mechanical fall upon her face, with a nasal fracture and RIGHT scalp contusion.  Sister reports patient with poor functional capacity with frequent falls with a reported nondiagnostic workup by a neurologist.  Patient presently offers no complaint.

## 2021-05-05 NOTE — H&P ADULT - NSHPPHYSICALEXAM_GEN_ALL_CORE
Physical exam with an elderly, chronically ill appearing obese F, in no acute distress    Afebrile.   HR  98   RR 14   /71   97% on RA    HEENT< PERRL< EOMI, RIGHT periorbital swelling, nasal contusion but NO active hemorrhage noted.  Neck supple  NO thyromegaly  Chest poor effort, decreased breath sounds at the bases  Cor s1 s2  Refused breast exam  Abdomen obese, soft, normal bowel sounds, no rebound. nontender  Skin see above.   B/L LE with below knee confluent erythema and warmth, NO crepitus noted.   Poor nail hygiene but no ulcers noted  NO SI/HI,.  Neurologic Alert to self/ hospital   Speech fluent.   Cognition with poor short term recall but interactive with examiner with no need for prompting.  UE/LE 5/5

## 2021-05-05 NOTE — H&P ADULT - PROBLEM SELECTOR PLAN 6
Transitions of Care Status:  1.  Name of PCP:     Morenita Link DO (PCP-- [facility] )  [phone]  2.  PCP Contacted on Admission: [ ] Y    [x ] N    3.  PCP contacted at Discharge: [ ] Y    [ ] N    [ ] N/A  4.  Post-Discharge Appointment Date and Location:  5.  Summary of Handoff given to PCP:

## 2021-05-05 NOTE — ED PROVIDER NOTE - PMH
Dementia with behavioral disturbance, unspecified dementia type    Hyperlipidemia, unspecified hyperlipidemia type    Hypertension, unspecified type    Obesity, unspecified classification, unspecified obesity type, unspecified whether serious comorbidity present

## 2021-05-05 NOTE — H&P ADULT - ASSESSMENT
NIGHT HOSPITALIST:   NIGHT HOSPITALIST:    S/P presumed mechanical fall in the setting of patient with apparently progressively impaired functional and mild to moderate cognitive impairment in this patient with a complex medical history of essential HTN, obesity and apparent presumed failure of B/L cellulitis from oral antibiotics, along with nasal fracture, small RIGHT scalp hematoma but no intracranial hemorrhage and no present evidence of epistaxis>>patient also with clinical evidence of congestive heart failure.    Upgraded IV antibiotics ot IV Zosyn and IV Vancomycin to cover for Pseudomonas and MRSA coverage.   Would consider ID opinion in the AM.    Upgraded to telemetry given presentation of heart failure.  Echo.  Troponin assay.  Would consider contacting patient's cardiologist, Dr. Ireland, in the AM for evaluation.    I contacted and reviewed with Dr. Parkinson of plastic surgery who will see the patient in the AM.    Venous Duplex ordered by the ER>>risks/benefits reviewed with patient's sister, who agrees to pharmacologic DVT prophylaxis given presenting CHF and B/L LE anasarca.   Patient with clinically stable RIGHT scalp hematoma and NO evidence of epistaxis from the nasal fracture.         NIGHT HOSPITALIST:    S/P presumed mechanical fall in the setting of patient with apparently progressively impaired functional and mild to moderate cognitive impairment in this patient with a complex medical history of essential HTN, obesity and apparent presumed failure of B/L cellulitis from oral antibiotics, along with nasal fracture, small RIGHT scalp hematoma but no intracranial hemorrhage and no present evidence of epistaxis>>patient also with clinical evidence of congestive heart failure.    Upgraded IV antibiotics ot IV Zosyn and IV Vancomycin to cover for Pseudomonas and MRSA coverage.   Would consider ID opinion in the AM.    Upgraded to telemetry given presentation of heart failure.  Echo.  Troponin assay.  Would consider contacting patient's cardiologist, Dr. Ireland, in the AM for evaluation.    I contacted and reviewed with Dr. Parkinson of plastic surgery who will see the patient in the AM.    Venous Duplex ordered by the ER>>risks/benefits reviewed with patient's sister, who agrees to pharmacologic DVT prophylaxis given presenting CHF and B/L LE anasarca.   Patient with clinically stable RIGHT scalp hematoma and NO evidence of epistaxis from the nasal fracture.         NIGHT HOSPITALIST:    S/P presumed mechanical fall in the setting of patient with apparently progressively impaired functional and mild to moderate cognitive impairment in this patient with a complex medical history of essential HTN, obesity and apparent presumed failure of B/L cellulitis from oral antibiotics, along with nasal fracture, small RIGHT scalp hematoma but no intracranial hemorrhage and no present evidence of epistaxis>>patient also with clinical evidence of congestive heart failure.    Upgraded IV antibiotics ot IV Zosyn and IV Vancomycin to cover for Pseudomonas and MRSA coverage.   Would consider ID opinion in the AM.    Upgraded to telemetry given presentation of heart failure.  Echo.  Troponin assay.  Would consider contacting patient's cardiologist, Dr. Ireland, in the AM for evaluation.    I contacted and reviewed with Dr. Parkinson of plastic surgery who will see the patient in the AM.    Will temporarily HOLD the Namenda with patient's fall and BEERS risk.    Sister wishes patient for PO intake.  Soft diet but will assume aspiration precautions.    Venous Duplex ordered by the ER>>risks/benefits reviewed with patient's sister, who agrees to pharmacologic DVT prophylaxis given presenting CHF and B/L LE anasarca.   Patient with clinically stable RIGHT scalp hematoma and NO evidence of epistaxis from the nasal fracture.         NIGHT HOSPITALIST:    S/P presumed mechanical fall in the setting of patient with apparently progressively impaired functional and mild to moderate cognitive impairment in this patient with a complex medical history of essential HTN, obesity and apparent presumed failure of B/L cellulitis from oral antibiotics, along with nasal fracture, small RIGHT scalp hematoma but no intracranial hemorrhage and no present evidence of epistaxis>>patient also with clinical evidence of congestive heart failure.    Upgraded IV antibiotics ot IV Zosyn and IV Vancomycin to cover for Pseudomonas and MRSA coverage.   Would consider ID opinion in the AM.    Upgraded to telemetry given presentation of heart failure.  Echo.  Troponin assay.  Would consider contacting patient's cardiologist, Dr. Ireland, in the AM for evaluation.    I contacted and reviewed with Dr. Parkinson of plastic surgery who will see the patient in the AM.    Will temporarily HOLD the Namenda with patient's fall and BEERS risk.    Sister wishes patient for PO intake.  Soft diet but will assume aspiration precautions.    Venous Duplex ordered by the ER>>risks/benefits reviewed with patient's sister, who agrees to pharmacologic DVT prophylaxis given presenting CHF and B/L LE anasarca.   Patient with clinically stable RIGHT scalp hematoma and NO evidence of epistaxis from the nasal fracture.    Sister aware of course and agrees with plan/care as above.   Given comorbidities, patient's long term prognosis is guarded.   Emotional support provided to patient/ sister.  The sister is not yet ready to discuss advance directives.    Care reviewed with covering NP/PA for endorsement to my physician colleagues.    Sandoval Jin MD  [phone]       NIGHT HOSPITALIST:    S/P presumed mechanical fall in the setting of patient with apparently progressively impaired functional and mild to moderate cognitive impairment in this patient with a complex medical history of essential HTN, obesity and apparent presumed failure of B/L cellulitis from oral antibiotics, along with nasal fracture, small RIGHT scalp hematoma but no intracranial hemorrhage and no present evidence of epistaxis>>patient also with clinical evidence of congestive heart failure.    Upgraded IV antibiotics ot IV Zosyn and IV Vancomycin to cover for Pseudomonas and MRSA coverage.   Would consider ID opinion in the AM.    Upgraded to telemetry given presentation of heart failure.  Echo.  Troponin assay.  Would consider contacting patient's cardiologist, Dr. Ireland, in the AM for evaluation.    I contacted and reviewed with Dr. Parkinson of plastic surgery who will see the patient in the AM.    Will temporarily HOLD the Namenda with patient's fall and BEERS risk.    Sister wishes patient for PO intake.  Soft diet but will assume aspiration precautions.    Venous Duplex ordered by the ER>>risks/benefits reviewed with patient's sister, who agrees to pharmacologic DVT prophylaxis given presenting CHF and B/L LE anasarca.   Patient with clinically stable RIGHT scalp hematoma and NO evidence of epistaxis from the nasal fracture.    Sister aware of course and agrees with plan/care as above.   Given comorbidities, patient's long term prognosis is guarded.   Emotional support provided to patient/ sister.  The sister is not yet ready to discuss advance directives.    Care reviewed with covering NP, Antoinette,  for endorsement to my physician colleagues.    Sandoval Jin MD  [phone]

## 2021-05-05 NOTE — ED PROVIDER NOTE - OBJECTIVE STATEMENT
75 Yr old female k/c Dementia, HTN HLD Morbidly Obese h/o B/L swollen and red LE on antibiotics following up with PMD today, followed by accidental ground level forward fall, hit the front of face, +ve ENT bleed, no LOC. EMS arrived on site and assisted patient up, did not ambulate.   Brought to ED for evaluation.   As per sister, patient having frequent falls once per month for several months. Is not 75 Yr old female k/c Dementia, HTN HLD Morbidly Obese h/o B/L swollen and red LE on antibiotics following up with PMD today, followed by accidental ground level forward fall, hit the front of face, +ve ENT bleed, no LOC. EMS arrived on site and assisted patient up, did not ambulate.   Brought to ED for evaluation.   As per sister, patient having frequent falls once per month for several months. Is not able to walk normally due to over weight. They are following with a neurologist h/e no causal reason identified.

## 2021-05-05 NOTE — ED ADULT NURSE REASSESSMENT NOTE - NS ED NURSE REASSESS COMMENT FT1
pt swabbed for covid with pt having active bleeding from nares pinched nares with ice pack to bridge of nose pt pending radiology studies sister at bedside

## 2021-05-05 NOTE — ED ADULT NURSE NOTE - NSIMPLEMENTINTERV_GEN_ALL_ED
Implemented All Fall Risk Interventions:  [place] to call system. Call bell, personal items and telephone within reach. Instruct patient to call for assistance. Room bathroom lighting operational. Non-slip footwear when patient is off stretcher. Physically safe environment: no spills, clutter or unnecessary equipment. Stretcher in lowest position, wheels locked, appropriate side rails in place. Provide visual cue, wrist band, yellow gown, etc. Monitor gait and stability. Monitor for mental status changes and reorient to person, place, and time. Review medications for side effects contributing to fall risk. Reinforce activity limits and safety measures with patient and family.

## 2021-05-05 NOTE — ED PROVIDER NOTE - CLINICAL SUMMARY MEDICAL DECISION MAKING FREE TEXT BOX
75 Yr old female k/c Dementia, HTN HLD Morbidly Obese h/o B/L swollen and red LE on antibiotics following up with PMD today, followed by accidental ground level forward fall. VS stable. PE right forehead bump, small nasal bridge laceration. Labs, CT, Observe. 75 Yr old female k/c Dementia, HTN HLD Morbidly Obese h/o B/L swollen and red LE on antibiotics following up with PMD today, followed by accidental ground level forward fall. VS stable. PE right forehead bump, small nasal bridge laceration. Labs, CT, Observe.  lasix x1  cefazolin   LE duplex   Admit for PT eval and possible palcement

## 2021-05-05 NOTE — H&P ADULT - NSHPSOURCEINFOTX_GEN_ALL_CORE
History from patient not reliable--corroborated history and Medex with patient's adult sister, Ghada Patterson .   Patient bilingual but interviewed by examiner in patient's native Jamaican--patient declined  device in the ER. History from patient not reliable--corroborated history and Medex with patient's adult sister, Ghada Patterson .   Patient bilingual but interviewed by examiner in patient's native Panamanian--patient declined  device in the ER.  Partial Medex review by ER pharmacist with patient's pharmacy.

## 2021-05-05 NOTE — H&P ADULT - NSHPSOCIALHISTORY_GEN_ALL_CORE
No tobacco or ethanol history.    Supportive sister. No tobacco or ethanol history.    Supportive sister.  Patient reported by sister to have completed her COVID-19 vaccine series.

## 2021-05-06 DIAGNOSIS — Z29.9 ENCOUNTER FOR PROPHYLACTIC MEASURES, UNSPECIFIED: ICD-10-CM
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
## 2021-05-06 DIAGNOSIS — I10 ESSENTIAL (PRIMARY) HYPERTENSION: ICD-10-CM

## 2021-05-06 DIAGNOSIS — R41.89 OTHER SYMPTOMS AND SIGNS INVOLVING COGNITIVE FUNCTIONS AND AWARENESS: ICD-10-CM

## 2021-05-06 DIAGNOSIS — S02.2XXA FRACTURE OF NASAL BONES, INITIAL ENCOUNTER FOR CLOSED FRACTURE: ICD-10-CM

## 2021-05-06 DIAGNOSIS — R60.0 LOCALIZED EDEMA: ICD-10-CM

## 2021-05-06 DIAGNOSIS — I50.9 HEART FAILURE, UNSPECIFIED: ICD-10-CM

## 2021-05-06 DIAGNOSIS — F03.91 UNSPECIFIED DEMENTIA WITH BEHAVIORAL DISTURBANCE: ICD-10-CM

## 2021-05-06 DIAGNOSIS — Z02.9 ENCOUNTER FOR ADMINISTRATIVE EXAMINATIONS, UNSPECIFIED: ICD-10-CM

## 2021-05-06 DIAGNOSIS — L03.119 CELLULITIS OF UNSPECIFIED PART OF LIMB: ICD-10-CM

## 2021-05-06 DIAGNOSIS — E78.5 HYPERLIPIDEMIA, UNSPECIFIED: ICD-10-CM

## 2021-05-06 LAB
ANION GAP SERPL CALC-SCNC: 12 MMOL/L — SIGNIFICANT CHANGE UP (ref 5–17)
APPEARANCE UR: CLEAR — SIGNIFICANT CHANGE UP
BASOPHILS # BLD AUTO: 0.06 K/UL — SIGNIFICANT CHANGE UP (ref 0–0.2)
BASOPHILS NFR BLD AUTO: 0.8 % — SIGNIFICANT CHANGE UP (ref 0–2)
BILIRUB UR-MCNC: NEGATIVE — SIGNIFICANT CHANGE UP
BUN SERPL-MCNC: 17 MG/DL — SIGNIFICANT CHANGE UP (ref 7–23)
CALCIUM SERPL-MCNC: 8.6 MG/DL — SIGNIFICANT CHANGE UP (ref 8.4–10.5)
CHLORIDE SERPL-SCNC: 101 MMOL/L — SIGNIFICANT CHANGE UP (ref 96–108)
CO2 SERPL-SCNC: 26 MMOL/L — SIGNIFICANT CHANGE UP (ref 22–31)
COLOR SPEC: SIGNIFICANT CHANGE UP
CREAT SERPL-MCNC: 1.07 MG/DL — SIGNIFICANT CHANGE UP (ref 0.5–1.3)
DIFF PNL FLD: NEGATIVE — SIGNIFICANT CHANGE UP
EOSINOPHIL # BLD AUTO: 0.24 K/UL — SIGNIFICANT CHANGE UP (ref 0–0.5)
EOSINOPHIL NFR BLD AUTO: 3.4 % — SIGNIFICANT CHANGE UP (ref 0–6)
GLUCOSE SERPL-MCNC: 106 MG/DL — HIGH (ref 70–99)
GLUCOSE UR QL: NEGATIVE — SIGNIFICANT CHANGE UP
HCT VFR BLD CALC: 30.2 % — LOW (ref 34.5–45)
HGB BLD-MCNC: 9.5 G/DL — LOW (ref 11.5–15.5)
IMM GRANULOCYTES NFR BLD AUTO: 0.3 % — SIGNIFICANT CHANGE UP (ref 0–1.5)
INR BLD: 1.12 RATIO — SIGNIFICANT CHANGE UP (ref 0.88–1.16)
KETONES UR-MCNC: NEGATIVE — SIGNIFICANT CHANGE UP
LEUKOCYTE ESTERASE UR-ACNC: NEGATIVE — SIGNIFICANT CHANGE UP
LYMPHOCYTES # BLD AUTO: 1.63 K/UL — SIGNIFICANT CHANGE UP (ref 1–3.3)
LYMPHOCYTES # BLD AUTO: 23.1 % — SIGNIFICANT CHANGE UP (ref 13–44)
MAGNESIUM SERPL-MCNC: 2.1 MG/DL — SIGNIFICANT CHANGE UP (ref 1.6–2.6)
MCHC RBC-ENTMCNC: 24.7 PG — LOW (ref 27–34)
MCHC RBC-ENTMCNC: 31.5 GM/DL — LOW (ref 32–36)
MCV RBC AUTO: 78.6 FL — LOW (ref 80–100)
MONOCYTES # BLD AUTO: 0.81 K/UL — SIGNIFICANT CHANGE UP (ref 0–0.9)
MONOCYTES NFR BLD AUTO: 11.5 % — SIGNIFICANT CHANGE UP (ref 2–14)
NEUTROPHILS # BLD AUTO: 4.31 K/UL — SIGNIFICANT CHANGE UP (ref 1.8–7.4)
NEUTROPHILS NFR BLD AUTO: 60.9 % — SIGNIFICANT CHANGE UP (ref 43–77)
NITRITE UR-MCNC: NEGATIVE — SIGNIFICANT CHANGE UP
NRBC # BLD: 0 /100 WBCS — SIGNIFICANT CHANGE UP (ref 0–0)
NT-PROBNP SERPL-SCNC: 617 PG/ML — HIGH (ref 0–300)
PH UR: 7.5 — SIGNIFICANT CHANGE UP (ref 5–8)
PHOSPHATE SERPL-MCNC: 3.3 MG/DL — SIGNIFICANT CHANGE UP (ref 2.5–4.5)
PLATELET # BLD AUTO: 317 K/UL — SIGNIFICANT CHANGE UP (ref 150–400)
POTASSIUM SERPL-MCNC: 4 MMOL/L — SIGNIFICANT CHANGE UP (ref 3.5–5.3)
POTASSIUM SERPL-SCNC: 4 MMOL/L — SIGNIFICANT CHANGE UP (ref 3.5–5.3)
PROT UR-MCNC: NEGATIVE — SIGNIFICANT CHANGE UP
PROTHROM AB SERPL-ACNC: 13.4 SEC — SIGNIFICANT CHANGE UP (ref 10.6–13.6)
RBC # BLD: 3.84 M/UL — SIGNIFICANT CHANGE UP (ref 3.8–5.2)
RBC # FLD: 15.8 % — HIGH (ref 10.3–14.5)
SODIUM SERPL-SCNC: 139 MMOL/L — SIGNIFICANT CHANGE UP (ref 135–145)
SP GR SPEC: 1.02 — SIGNIFICANT CHANGE UP (ref 1.01–1.02)
TROPONIN T, HIGH SENSITIVITY RESULT: 14 NG/L — SIGNIFICANT CHANGE UP (ref 0–51)
TROPONIN T, HIGH SENSITIVITY RESULT: 15 NG/L — SIGNIFICANT CHANGE UP (ref 0–51)
UROBILINOGEN FLD QL: NEGATIVE — SIGNIFICANT CHANGE UP
WBC # BLD: 7.07 K/UL — SIGNIFICANT CHANGE UP (ref 3.8–10.5)
WBC # FLD AUTO: 7.07 K/UL — SIGNIFICANT CHANGE UP (ref 3.8–10.5)

## 2021-05-06 PROCEDURE — 93970 EXTREMITY STUDY: CPT | Mod: 26

## 2021-05-06 PROCEDURE — 99222 1ST HOSP IP/OBS MODERATE 55: CPT

## 2021-05-06 PROCEDURE — 99233 SBSQ HOSP IP/OBS HIGH 50: CPT

## 2021-05-06 RX ORDER — EZETIMIBE 10 MG/1
1 TABLET ORAL
Qty: 0 | Refills: 0 | DISCHARGE

## 2021-05-06 RX ORDER — QUETIAPINE FUMARATE 200 MG/1
12.5 TABLET, FILM COATED ORAL AT BEDTIME
Refills: 0 | Status: DISCONTINUED | OUTPATIENT
Start: 2021-05-06 | End: 2021-05-12

## 2021-05-06 RX ORDER — PREGABALIN 225 MG/1
1 CAPSULE ORAL
Qty: 0 | Refills: 0 | DISCHARGE

## 2021-05-06 RX ORDER — ENOXAPARIN SODIUM 100 MG/ML
40 INJECTION SUBCUTANEOUS DAILY
Refills: 0 | Status: DISCONTINUED | OUTPATIENT
Start: 2021-05-06 | End: 2021-05-12

## 2021-05-06 RX ORDER — ASPIRIN/CALCIUM CARB/MAGNESIUM 324 MG
81 TABLET ORAL DAILY
Refills: 0 | Status: DISCONTINUED | OUTPATIENT
Start: 2021-05-06 | End: 2021-05-12

## 2021-05-06 RX ORDER — VANCOMYCIN HCL 1 G
VIAL (EA) INTRAVENOUS
Refills: 0 | Status: DISCONTINUED | OUTPATIENT
Start: 2021-05-06 | End: 2021-05-06

## 2021-05-06 RX ORDER — NYSTATIN CREAM 100000 [USP'U]/G
1 CREAM TOPICAL ONCE
Refills: 0 | Status: COMPLETED | OUTPATIENT
Start: 2021-05-06 | End: 2021-05-07

## 2021-05-06 RX ORDER — CARVEDILOL PHOSPHATE 80 MG/1
3.12 CAPSULE, EXTENDED RELEASE ORAL EVERY 12 HOURS
Refills: 0 | Status: DISCONTINUED | OUTPATIENT
Start: 2021-05-06 | End: 2021-05-07

## 2021-05-06 RX ORDER — VANCOMYCIN HCL 1 G
1000 VIAL (EA) INTRAVENOUS ONCE
Refills: 0 | Status: COMPLETED | OUTPATIENT
Start: 2021-05-06 | End: 2021-05-06

## 2021-05-06 RX ORDER — LOVASTATIN 20 MG
1 TABLET ORAL
Qty: 0 | Refills: 0 | DISCHARGE

## 2021-05-06 RX ORDER — ASPIRIN/CALCIUM CARB/MAGNESIUM 324 MG
1 TABLET ORAL
Qty: 0 | Refills: 0 | DISCHARGE

## 2021-05-06 RX ORDER — FAMOTIDINE 10 MG/ML
1 INJECTION INTRAVENOUS
Qty: 0 | Refills: 0 | DISCHARGE

## 2021-05-06 RX ORDER — EZETIMIBE 10 MG/1
10 TABLET ORAL AT BEDTIME
Refills: 0 | Status: DISCONTINUED | OUTPATIENT
Start: 2021-05-06 | End: 2021-05-12

## 2021-05-06 RX ORDER — CHOLECALCIFEROL (VITAMIN D3) 125 MCG
1 CAPSULE ORAL
Qty: 0 | Refills: 0 | DISCHARGE

## 2021-05-06 RX ORDER — CHOLECALCIFEROL (VITAMIN D3) 125 MCG
1000 CAPSULE ORAL DAILY
Refills: 0 | Status: DISCONTINUED | OUTPATIENT
Start: 2021-05-06 | End: 2021-05-12

## 2021-05-06 RX ORDER — PREGABALIN 225 MG/1
1000 CAPSULE ORAL DAILY
Refills: 0 | Status: DISCONTINUED | OUTPATIENT
Start: 2021-05-06 | End: 2021-05-12

## 2021-05-06 RX ORDER — MEMANTINE HYDROCHLORIDE 10 MG/1
1 TABLET ORAL
Qty: 0 | Refills: 0 | DISCHARGE

## 2021-05-06 RX ORDER — PIPERACILLIN AND TAZOBACTAM 4; .5 G/20ML; G/20ML
3.38 INJECTION, POWDER, LYOPHILIZED, FOR SOLUTION INTRAVENOUS EVERY 8 HOURS
Refills: 0 | Status: DISCONTINUED | OUTPATIENT
Start: 2021-05-06 | End: 2021-05-06

## 2021-05-06 RX ORDER — SERTRALINE 25 MG/1
1 TABLET, FILM COATED ORAL
Qty: 0 | Refills: 0 | DISCHARGE

## 2021-05-06 RX ORDER — PIPERACILLIN AND TAZOBACTAM 4; .5 G/20ML; G/20ML
3.38 INJECTION, POWDER, LYOPHILIZED, FOR SOLUTION INTRAVENOUS ONCE
Refills: 0 | Status: COMPLETED | OUTPATIENT
Start: 2021-05-06 | End: 2021-05-06

## 2021-05-06 RX ORDER — VANCOMYCIN HCL 1 G
1000 VIAL (EA) INTRAVENOUS EVERY 12 HOURS
Refills: 0 | Status: DISCONTINUED | OUTPATIENT
Start: 2021-05-06 | End: 2021-05-06

## 2021-05-06 RX ORDER — SERTRALINE 25 MG/1
100 TABLET, FILM COATED ORAL DAILY
Refills: 0 | Status: DISCONTINUED | OUTPATIENT
Start: 2021-05-06 | End: 2021-05-12

## 2021-05-06 RX ORDER — LOVASTATIN 20 MG
20 TABLET ORAL AT BEDTIME
Refills: 0 | Status: DISCONTINUED | OUTPATIENT
Start: 2021-05-06 | End: 2021-05-12

## 2021-05-06 RX ORDER — FAMOTIDINE 10 MG/ML
20 INJECTION INTRAVENOUS
Refills: 0 | Status: DISCONTINUED | OUTPATIENT
Start: 2021-05-06 | End: 2021-05-12

## 2021-05-06 RX ORDER — QUETIAPINE FUMARATE 200 MG/1
0.5 TABLET, FILM COATED ORAL
Qty: 0 | Refills: 0 | DISCHARGE

## 2021-05-06 RX ORDER — FUROSEMIDE 40 MG
20 TABLET ORAL DAILY
Refills: 0 | Status: DISCONTINUED | OUTPATIENT
Start: 2021-05-06 | End: 2021-05-07

## 2021-05-06 RX ADMIN — Medication 650 MILLIGRAM(S): at 20:22

## 2021-05-06 RX ADMIN — Medication 650 MILLIGRAM(S): at 19:52

## 2021-05-06 RX ADMIN — PIPERACILLIN AND TAZOBACTAM 200 GRAM(S): 4; .5 INJECTION, POWDER, LYOPHILIZED, FOR SOLUTION INTRAVENOUS at 01:14

## 2021-05-06 RX ADMIN — EZETIMIBE 10 MILLIGRAM(S): 10 TABLET ORAL at 21:13

## 2021-05-06 RX ADMIN — CARVEDILOL PHOSPHATE 3.12 MILLIGRAM(S): 80 CAPSULE, EXTENDED RELEASE ORAL at 18:00

## 2021-05-06 RX ADMIN — FAMOTIDINE 20 MILLIGRAM(S): 10 INJECTION INTRAVENOUS at 18:00

## 2021-05-06 RX ADMIN — Medication 20 MILLIGRAM(S): at 21:13

## 2021-05-06 RX ADMIN — PREGABALIN 1000 MICROGRAM(S): 225 CAPSULE ORAL at 11:46

## 2021-05-06 RX ADMIN — Medication 250 MILLIGRAM(S): at 01:33

## 2021-05-06 RX ADMIN — PIPERACILLIN AND TAZOBACTAM 25 GRAM(S): 4; .5 INJECTION, POWDER, LYOPHILIZED, FOR SOLUTION INTRAVENOUS at 13:55

## 2021-05-06 RX ADMIN — FAMOTIDINE 20 MILLIGRAM(S): 10 INJECTION INTRAVENOUS at 05:54

## 2021-05-06 RX ADMIN — Medication 1000 UNIT(S): at 11:46

## 2021-05-06 RX ADMIN — Medication 20 MILLIGRAM(S): at 05:54

## 2021-05-06 RX ADMIN — Medication 975 MILLIGRAM(S): at 00:03

## 2021-05-06 RX ADMIN — Medication 650 MILLIGRAM(S): at 10:30

## 2021-05-06 RX ADMIN — CARVEDILOL PHOSPHATE 3.12 MILLIGRAM(S): 80 CAPSULE, EXTENDED RELEASE ORAL at 05:54

## 2021-05-06 RX ADMIN — PIPERACILLIN AND TAZOBACTAM 25 GRAM(S): 4; .5 INJECTION, POWDER, LYOPHILIZED, FOR SOLUTION INTRAVENOUS at 05:54

## 2021-05-06 RX ADMIN — SERTRALINE 100 MILLIGRAM(S): 25 TABLET, FILM COATED ORAL at 11:46

## 2021-05-06 RX ADMIN — Medication 650 MILLIGRAM(S): at 09:56

## 2021-05-06 RX ADMIN — Medication 81 MILLIGRAM(S): at 11:46

## 2021-05-06 RX ADMIN — ENOXAPARIN SODIUM 40 MILLIGRAM(S): 100 INJECTION SUBCUTANEOUS at 11:46

## 2021-05-06 RX ADMIN — QUETIAPINE FUMARATE 12.5 MILLIGRAM(S): 200 TABLET, FILM COATED ORAL at 21:12

## 2021-05-06 NOTE — CONSULT NOTE ADULT - SUBJECTIVE AND OBJECTIVE BOX
Patient is a 75y old  Female who presents with a chief complaint of S/P presumed mechanical fall (06 May 2021 06:14)      HPI:  NIGHT HOSPITALIST:   Patient UNKNOWN to me previously, assigned to me at this point via the ER to admit this 75 y /o F--followed by her office physicians above--patient with a history of moderate cognitive impairment maintained on Namenda--essential HTN, hyperlipidemia, obesity,  apparently being treated by her PCP for presumed B/L cellulitis with oral antibiotics (Bactrim?) with patient with a presumed mechanical fall upon her face, with a nasal fracture and RIGHT scalp contusion.  Sister reports patient with poor functional capacity with frequent falls with a reported nondiagnostic workup by a neurologist.  Patient presently offers no complaint. (05 May 2021 22:48)     In the ED afebrile, normotensive and tachycardic > 90. On presentation WBC of 9.94 with 74.8% PMN. U/A (5/5) with no pyuria. COVID19 PCR (5/5) Negative.  CXR (5/5) with Diffuse bilateral reticular opacities, likely pulmonary vascular congestion. XR Tib-Fib (5/5) with No acute fracture or dislocation, Bilateral TKAs without complication. Bilateral lower leg edema. CT A/P (5/5) with No CT evidence of acute visceral or osseous injury in the abdomen and pelvis.      prior hospital charts reviewed [  ]  primary team notes reviewed [  ]  other consultant notes reviewed [  ]    PAST MEDICAL & SURGICAL HISTORY:  Dementia with behavioral disturbance, unspecified dementia type    Hypertension, unspecified type    Hyperlipidemia, unspecified hyperlipidemia type    Obesity, unspecified classification, unspecified obesity type, unspecified whether serious comorbidity present    Status post total bilateral knee replacement        Allergies  No Known Allergies    ANTIMICROBIALS (past 90 days)  MEDICATIONS  (STANDING):    ceFAZolin   IVPB   100 mL/Hr IV Intermittent (21 @ 21:39)    piperacillin/tazobactam IVPB.   200 mL/Hr IV Intermittent (21 @ 01:14)    piperacillin/tazobactam IVPB..   25 mL/Hr IV Intermittent (21 @ 05:54)    vancomycin  IVPB   250 mL/Hr IV Intermittent (21 @ 01:33)      ANTIMICROBIALS:    piperacillin/tazobactam IVPB.. 3.375 every 8 hours  vancomycin  IVPB    vancomycin  IVPB 1000 every 12 hours    OTHER MEDS: MEDICATIONS  (STANDING):  acetaminophen   Tablet .. 650 every 6 hours PRN  aspirin enteric coated 81 daily  carvedilol 3.125 every 12 hours  enoxaparin Injectable 40 daily  ezetimibe 10 at bedtime  famotidine    Tablet 20 two times a day  furosemide   Injectable 20 daily  lovastatin 20 at bedtime  sertraline 100 daily    SOCIAL HISTORY:   hx smoking  non-smoker    FAMILY HISTORY:  No pertinent family history in first degree relatives      REVIEW OF SYSTEMS  [  ] ROS unobtainable because:    [  ] All other systems negative except as noted below:	    Constitutional:  [ ] fever [ ] chills  [ ] weight loss  [ ] weakness  Skin:  [ ] rash [ ] phlebitis	  Eyes: [ ] icterus [ ] pain  [ ] discharge	  ENMT: [ ] sore throat  [ ] thrush [ ] ulcers [ ] exudates  Respiratory: [ ] dyspnea [ ] hemoptysis [ ] cough [ ] sputum	  Cardiovascular:  [ ] chest pain [ ] palpitations [ ] edema	  Gastrointestinal:  [ ] nausea [ ] vomiting [ ] diarrhea [ ] constipation [ ] pain	  Genitourinary:  [ ] dysuria [ ] frequency [ ] hematuria [ ] discharge [ ] flank pain  [ ] incontinence  Musculoskeletal:  [ ] myalgias [ ] arthralgias [ ] arthritis  [ ] back pain  Neurological:  [ ] headache [ ] seizures  [ ] confusion/altered mental status  Psychiatric:  [ ] anxiety [ ] depression	  Hematology/Lymphatics:  [ ] lymphadenopathy  Endocrine:  [ ] adrenal [ ] thyroid  Allergic/Immunologic:	 [ ] transplant [ ] seasonal    Vital Signs Last 24 Hrs  T(F): 98.3 (21 @ 03:13), Max: 98.3 (21 @ 03:13)  Vital Signs Last 24 Hrs  HR: 100 (21 @ 03:13) (85 - 100)  BP: 155/78 (21 @ 03:13) (130/61 - 170/90)  RR: 18 (21 @ 03:13)  SpO2: 98% (21 @ 03:13) (95% - 100%)  Wt(kg): --    PHYSICAL EXAMINATION:  General: Alert and Awake, NAD  HEENT: PERRL, EOMI, No subconjunctival hemorrhages, Oropharynx Clear, MMM  Neck: Supple, No ANA  Cardiac: RRR, No M/R/G  Resp: CTAB, No Wh/Rh/Ra  Abdomen: NBS, NT/ND, No HSM, No rigidity or guarding  MSK: No LE edema. No stigmata of IE. No evidence of phlebitis. No evidence of synovitis.  : No garcia  Skin: No rashes or lesions. Skin is warm and dry to the touch.   Neuro: Alert and Awake. CN 2-12 Grossly intact. Moves all four extremities spontaneously.  Psych: Calm, Pleasant, Cooperative                          9.5    7.07  )-----------( 317      ( 06 May 2021 10:11 )             30.2     -    139  |  101  |  17  ----------------------------<  106<H>  4.0   |  26  |  1.07    Ca    8.6      06 May 2021 10:11  Phos  3.3       Mg     2.1     -    TPro  6.7  /  Alb  3.8  /  TBili  0.2  /  DBili  x   /  AST  21  /  ALT  15  /  AlkPhos  88  -    Urinalysis Basic - ( 05 May 2021 22:57 )    Color: Light Yellow / Appearance: Clear / S.019 / pH: x  Gluc: x / Ketone: Negative  / Bili: Negative / Urobili: Negative   Blood: x / Protein: Negative / Nitrite: Negative   Leuk Esterase: Negative / RBC: x / WBC x   Sq Epi: x / Non Sq Epi: x / Bacteria: x    MICROBIOLOGY:                RADIOLOGY:    <The imaging below has been reviewed and visualized by me independently. Findings as detailed in report below>    EXAM:  CT ABDOMEN AND PELVIS IC                        PROCEDURE DATE:  2021    No CT evidence of acute visceral or osseous injury in the abdomen and pelvis.    EXAM:  CT REFORM SPINE L                        PROCEDURE DATE:  2021    Severe multilevel degenerative changes as described above. Mild L2-3 central stenosis, moderate L3-4 central stenosis and severe L4-5 and L5-S1 central stenosis on a degenerative basis due to disc bulge, facet osteophytic hypertrophy and redundancy of ligamentum flavum.  No acute lumbar vertebral fractures.    EXAM:  CT CERVICAL SPINE                        EXAM:  CT BRAIN                        EXAM:  CT MAXILLOFACIAL                        EXAM:  CT 3D RECONSTRUCT W OLGA                        PROCEDURE DATE:  2021    CT HEAD: No acute intracranial hemorrhage, mass effect, or CT evidence of acute territorial infarct.  CT CERVICAL SPINE: No acute displaced fracture or traumatic malalignment.  CT MAXILLOFACIAL: Acute nondisplaced fracture along the left nasal bone with left to rightward deviation of nasal septum and questionable area of additional fracture within the nasal septum. Right frontal scalp swelling.    EXAM:  XR TIB FIB AP LAT 2 VIEWS BI                        PROCEDURE DATE:  2021    No acute fracture or dislocation. Bilateral TKAs without complication. Bilateral lower leg edema.    EXAM:  XR CHEST PORTABLE ROUTINE 1V                        PROCEDURE DATE:  2021    Diffuse bilateral reticular opacities, likely pulmonary vascular congestion.     Patient is a 75y old  Female who presents with a chief complaint of S/P presumed mechanical fall (06 May 2021 06:14)      HPI:    75 year old female PMH Dementia, HTN, HLD, Obesity who presents to [facility] on  following a fall. Of note, apparently being treated by her PCP for presumed B/L cellulitis with oral antibiotics (Bactrim?) with patient with a presumed mechanical fall upon her face, with a nasal fracture and RIGHT scalp contusion.  Sister reports patient with poor functional capacity with frequent falls with a reported nondiagnostic workup by a neurologist.  Patient presently offers no complaint. (05 May 2021 22:48)     In the ED afebrile, normotensive and tachycardic > 90. On presentation WBC of 9.94 with 74.8% PMN. U/A () with no pyuria. COVID19 PCR () Negative.  CXR () with Diffuse bilateral reticular opacities, likely pulmonary vascular congestion. XR Tib-Fib () with No acute fracture or dislocation, Bilateral TKAs without complication. Bilateral lower leg edema. CT A/P () with No CT evidence of acute visceral or osseous injury in the abdomen and pelvis.      prior hospital charts reviewed [  ]  primary team notes reviewed [ x ]  other consultant notes reviewed [ x ]    PAST MEDICAL & SURGICAL HISTORY:  Dementia with behavioral disturbance, unspecified dementia type    Hypertension, unspecified type    Hyperlipidemia, unspecified hyperlipidemia type    Obesity, unspecified classification, unspecified obesity type, unspecified whether serious comorbidity present    Status post total bilateral knee replacement        Allergies  No Known Allergies    ANTIMICROBIALS (past 90 days)  MEDICATIONS  (STANDING):    ceFAZolin   IVPB   100 mL/Hr IV Intermittent (21 @ 21:39)    piperacillin/tazobactam IVPB.   200 mL/Hr IV Intermittent (21 @ 01:14)    piperacillin/tazobactam IVPB..   25 mL/Hr IV Intermittent (21 @ 05:54)    vancomycin  IVPB   250 mL/Hr IV Intermittent (21 @ 01:33)      ANTIMICROBIALS:    piperacillin/tazobactam IVPB.. 3.375 every 8 hours  vancomycin  IVPB    vancomycin  IVPB 1000 every 12 hours    OTHER MEDS: MEDICATIONS  (STANDING):  acetaminophen   Tablet .. 650 every 6 hours PRN  aspirin enteric coated 81 daily  carvedilol 3.125 every 12 hours  enoxaparin Injectable 40 daily  ezetimibe 10 at bedtime  famotidine    Tablet 20 two times a day  furosemide   Injectable 20 daily  lovastatin 20 at bedtime  sertraline 100 daily    SOCIAL HISTORY:  Patient denies smoking or etoh use.     FAMILY HISTORY:  No pertinent family history in first degree relatives      REVIEW OF SYSTEMS  [  ] ROS unobtainable because:    [  x] All other systems negative except as noted below:	    Constitutional:  [ ] fever [ ] chills  [ ] weight loss  [ ] weakness  Skin:  [ ] rash [ ] phlebitis	  Eyes: [ ] icterus [ ] pain  [ ] discharge	  ENMT: [ ] sore throat  [ ] thrush [ ] ulcers [ ] exudates  Respiratory: [ ] dyspnea [ ] hemoptysis [ ] cough [ ] sputum	  Cardiovascular:  [ ] chest pain [ ] palpitations [ ] edema	  Gastrointestinal:  [ ] nausea [ ] vomiting [ ] diarrhea [ ] constipation [ ] pain	  Genitourinary:  [ ] dysuria [ ] frequency [ ] hematuria [ ] discharge [ ] flank pain  [ ] incontinence  Musculoskeletal:  [ ] myalgias [ ] arthralgias [ ] arthritis  [ ] back pain  Neurological:  [ ] headache [ ] seizures  [ ] confusion/altered mental status  Psychiatric:  [ ] anxiety [ ] depression	  Hematology/Lymphatics:  [ ] lymphadenopathy  Endocrine:  [ ] adrenal [ ] thyroid  Allergic/Immunologic:	 [ ] transplant [ ] seasonal    Vital Signs Last 24 Hrs  T(F): 98.3 (21 @ 03:13), Max: 98.3 (21 @ 03:13)  Vital Signs Last 24 Hrs  HR: 100 (21 @ 03:13) (85 - 100)  BP: 155/78 (21 @ 03:13) (130/61 - 170/90)  RR: 18 (21 @ 03:13)  SpO2: 98% (21 @ 03:13) (95% - 100%)  Wt(kg): --    PHYSICAL EXAMINATION:  General: Alert and Awake, NAD  HEENT: PERRL, EOMI  Neck: Supple, No ANA  Cardiac: RRR, No M/R/G  Resp: CTAB, No Wh/Rh/Ra  Abdomen: NBS, NT/ND, No HSM, No rigidity or guarding  MSK: +Faint erythema of bilateral calves, nontender to palpation, no significant warmth to palpation. 1-2+ LE edema. No stigmata of IE. No evidence of phlebitis. No evidence of synovitis.  : No garcia  Skin: Faint erythema of bilateral calves. Skin is warm and dry to the touch.   Neuro: Alert and Awake. CN 2-12 Grossly intact. Moves all four extremities spontaneously.  Psych: Calm, Pleasant, Cooperative                          9.5    7.07  )-----------( 317      ( 06 May 2021 10:11 )             30.2         139  |  101  |  17  ----------------------------<  106<H>  4.0   |  26  |  1.07    Ca    8.6      06 May 2021 10:11  Phos  3.3       Mg     2.1         TPro  6.7  /  Alb  3.8  /  TBili  0.2  /  DBili  x   /  AST  21  /  ALT  15  /  AlkPhos  88      Urinalysis Basic - ( 05 May 2021 22:57 )    Color: Light Yellow / Appearance: Clear / S.019 / pH: x  Gluc: x / Ketone: Negative  / Bili: Negative / Urobili: Negative   Blood: x / Protein: Negative / Nitrite: Negative   Leuk Esterase: Negative / RBC: x / WBC x   Sq Epi: x / Non Sq Epi: x / Bacteria: x    MICROBIOLOGY:    COVID-19 PCR . (21 @ 17:21)   COVID-19 PCR: NotDetec:       RADIOLOGY:    <The imaging below has been reviewed and visualized by me independently. Findings as detailed in report below>    EXAM:  CT ABDOMEN AND PELVIS IC                        PROCEDURE DATE:  2021    No CT evidence of acute visceral or osseous injury in the abdomen and pelvis.    EXAM:  CT REFORM SPINE L                        PROCEDURE DATE:  2021    Severe multilevel degenerative changes as described above. Mild L2-3 central stenosis, moderate L3-4 central stenosis and severe L4-5 and L5-S1 central stenosis on a degenerative basis due to disc bulge, facet osteophytic hypertrophy and redundancy of ligamentum flavum.  No acute lumbar vertebral fractures.    EXAM:  CT CERVICAL SPINE                        EXAM:  CT BRAIN                        EXAM:  CT MAXILLOFACIAL                        EXAM:  CT 3D RECONSTRUCT W OLGA                        PROCEDURE DATE:  2021    CT HEAD: No acute intracranial hemorrhage, mass effect, or CT evidence of acute territorial infarct.  CT CERVICAL SPINE: No acute displaced fracture or traumatic malalignment.  CT MAXILLOFACIAL: Acute nondisplaced fracture along the left nasal bone with left to rightward deviation of nasal septum and questionable area of additional fracture within the nasal septum. Right frontal scalp swelling.    EXAM:  XR TIB FIB AP LAT 2 VIEWS BI                        PROCEDURE DATE:  2021    No acute fracture or dislocation. Bilateral TKAs without complication. Bilateral lower leg edema.    EXAM:  XR CHEST PORTABLE ROUTINE 1V                        PROCEDURE DATE:  2021    Diffuse bilateral reticular opacities, likely pulmonary vascular congestion.

## 2021-05-06 NOTE — PROGRESS NOTE ADULT - PROBLEM SELECTOR PLAN 1
per PCP office and sister patient presented to PCP on 4/28/21 with worsening lower extremity pain and swelling. was prescribed bactrim DS x 7 days which she started on 4/28 with no improvement, prompting her to return to PCP on 5/5 (where she unfortunately had fall on her way to appointment)  - b/l lower extremities with warmth and erythema though no fever nor leukocytosis  - s/p bactrim x 7 day course as outpatient  - started on IV vanco and zosyn overnight  - will check MRSA PCR as suspicion for MRSA low, if negative will d/c vanco  - f/u blood cultures  - f/u duplex of lower extremities to evaluate for DVT  - ID consulted, will f/u recs per PCP office and sister patient presented to PCP on 4/28/21 with worsening lower extremity pain and swelling. was prescribed bactrim DS x 7 days which she started on 4/28 with no improvement, prompting her to return to PCP on 5/5 (where she unfortunately had fall on her way to appointment)  - s/p bactrim x 7 day course as outpatient  - b/l lower extremities with warmth and erythema though no fever nor leukocytosis  - ID consult recs appreciated  - discussed with ID attending, appears more to be changes due to venous stasis rather than infection  - discontinue vancomycin and zosyn, monitor off abx  - f/u blood cultures  - f/u duplex of lower extremities to evaluate for DVT

## 2021-05-06 NOTE — PROGRESS NOTE ADULT - PROBLEM SELECTOR PLAN 2
non-displaced nasal fracture. s/p suture of nasal bridge lac by Plastics Surgery  - Plastic Surgery recs appreciated  - ice pack, elevated HOB  - outpatient f/u with Dr. Parkinson in 1-2 weeks after discharge

## 2021-05-06 NOTE — CONSULT NOTE ADULT - SUBJECTIVE AND OBJECTIVE BOX
Plastic Surgery Consult Note  ---------------------------------------    Chief Complaint:  Patient is a 75y old  Female who presents with a chief complaint of S/P presumed mechanical fall (05 May 2021 22:48)      HPI:  75yoF Hx of HTN, obesity, dementia, s/p mechanical ground level fall w/ minimally displaced nasal bone fracture.      PMH  Dementia with behavioral disturbance, unspecified dementia type    Hypertension, unspecified type    Hyperlipidemia, unspecified hyperlipidemia type    Obesity, unspecified classification, unspecified obesity type, unspecified whether serious comorbidity present        PSH  Status post total bilateral knee replacement        MEDS  Home Medications:  aspirin 81 mg oral tablet: 1 tab(s) orally once a day (06 May 2021 00:00)  Bactrim  mg-160 mg oral tablet: 1 tab(s) orally every 12 hours (06 May 2021 00:00)  carvedilol 3.125 mg oral tablet: 1 tab(s) orally 2 times a day (06 May 2021 00:00)  ezetimibe 10 mg oral tablet: 1 tab(s) orally once a day (06 May 2021 00:00)  famotidine 40 mg oral tablet: 1 tab(s) orally once a day (06 May 2021 00:00)  hydroCHLOROthiazide 12.5 mg oral capsule: 1 cap(s) orally once a day (06 May 2021 00:00)  lovastatin 20 mg oral tablet: 1 tab(s) orally once a day (06 May 2021 00:00)  Melatonin 10 mg oral tablet: 1 tab(s) orally once a day (at bedtime) (06 May 2021 00:00)  memantine 10 mg oral tablet: 1 tab(s) orally 2 times a day (06 May 2021 00:00)  SEROquel 25 mg oral tablet: 0.5 tab(s) orally once a day (at bedtime), As Needed (06 May 2021 00:00)  sertraline 100 mg oral tablet: 1 tab(s) orally once a day (06 May 2021 00:00)  Vitamin B-12 1000 mcg oral tablet: 1 tab(s) orally once a day (06 May 2021 00:00)  Vitamin D3 5000 intl units (125 mcg) oral tablet: 1 tab(s) orally once a day (06 May 2021 00:00)    Allergies  No Known Allergies      Social  Social History:  No tobacco or ethanol history.    Supportive sister.  Patient reported by sister to have completed her COVID-19 vaccine series. (05 May 2021 22:48)        Physical Exam  Gen: NAD, comfortable  HEENT:  Diffuse swelling throughout face, EOMI b/l , no signs of entrapment. Blood at nares with no signs of septal hematoma,  CN II-XII intact.  Normal occlusion.   CV: S1, S2, RRR Small laceration at bridge of nose  Pulm: CTA B/L  Abd: Soft, ND, NTP, no rebound, no guarding, no palpable organomegaly/masses  Ext: warm, no edema, palp dp/pt    T(C): 36.8 (21 @ 03:13), Max: 36.8 (21 @ 03:13)  HR: 100 (21 @ 03:13) (85 - 100)  BP: 155/78 (21 @ 03:13) (130/61 - 170/90)  RR: 18 (21 @ 03:13) (16 - 20)  SpO2: 98% (21 @ 03:13) (95% - 100%)  Wt(kg): --  Tmax: T(C): , Max: 36.8 (21 @ 03:13)  Wt(kg): --      Labs:                        10.0   9.94  )-----------( 318      ( 05 May 2021 16:59 )             33.1         138  |  103  |  22  ----------------------------<  123<H>  4.6   |  22  |  0.97    Ca    8.7      05 May 2021 16:59    TPro  6.7  /  Alb  3.8  /  TBili  0.2  /  DBili  x   /  AST  21  /  ALT  15  /  AlkPhos  88      PT/INR - ( 05 May 2021 16:59 )   PT: 12.9 sec;   INR: 1.08 ratio           Urinalysis Basic - ( 05 May 2021 22:57 )    Color: Light Yellow / Appearance: Clear / S.019 / pH: x  Gluc: x / Ketone: Negative  / Bili: Negative / Urobili: Negative   Blood: x / Protein: Negative / Nitrite: Negative   Leuk Esterase: Negative / RBC: x / WBC x   Sq Epi: x / Non Sq Epi: x / Bacteria: x            Imaging  EXAM:  CT CERVICAL SPINE                          EXAM:  CT BRAIN                          EXAM:  CT MAXILLOFACIAL                          EXAM:  CT 3D RECONSTRUCT W OLGA                            PROCEDURE DATE:  2021            INTERPRETATION:  CLINICAL INFORMATION: Head and neck pain status post fall.    TECHNIQUE: Noncontrast CT scan of the head, maxillofacial bones and cervical spine was performed. Thin section axial images with sagittal and coronal reformations were obtained.    COMPARISON: No similar prior studies available for comparison.    CT HEAD:    There is no acute intracranial hemorrhage or mass effect. There is no CT evidence of acute territorial infarct.    There are mild chronic white matter microvascular ischemic changes. Left cerebellar lacunar infarct.Prominence of the ventricles and sulci, compatible with age related volume loss.    There is a right frontal scalp hematoma. The calvarium is intact.    The visualized portions of the paranasal sinuses andmastoid air cells are clear.    CT CERVICAL SPINE:    The craniocervical junction is unremarkable.    There is mild anterolisthesis of C3 on C4.    Multilevel degenerative changes characterized by intervertebral disc height loss, disc osteophyte complexes, and facet and uncinate hypertrophy. This results in mild multilevel canal stenosis and neural foraminal narrowing.    There is no prevertebral soft tissue swelling.    There is no evidence for acute fracture or subluxation.    The visualized soft tissues are unremarkable.    Bilateral interlobular septal thickening is noted within the visualized lung apices.    CT MAXILLOFACIAL:  There is right frontal scalp swelling as noted above.    The globes are symmetric in size and contour. Extraocular muscles are not enlarged or deviated. No radiopaque orbital foreign bodies are visualized. The retrobulbar fat is preserved.    There is an acute nondisplaced fracture along the left nasal bone (), with left-to-right deviation of the nasal septum, and questionable additional area of additional fracture within the nasal septum (10-87).    The temporomandibular joints are intact.    The visualized paranasal sinuses are clear.    IMPRESSION:    CT HEAD: No acute intracranial hemorrhage, masseffect, or CT evidence of acute territorial infarct.    CT CERVICAL SPINE: No acute displaced fracture or traumatic malalignment.    CT MAXILLOFACIAL: Acute nondisplaced fracture along the left nasal bone with left to rightward deviation of nasal septum and questionable area of additional fracture within the nasal septum. Right frontal scalp swelling.              RAFAELA COTA MD; Resident Radiology  This document has been electronically signed.  OSCAR MON MD; Attending Radiologist  This documenthas been electronically signed. May  5 2021  7:13PM

## 2021-05-06 NOTE — PROGRESS NOTE ADULT - PROBLEM SELECTOR PLAN 5
- c/w home carvedilol 3.125mg q12h  - hold HCTZ 12.5mg PO daily as started recently for swelling, not HTN  - monitor vitals

## 2021-05-06 NOTE — PROGRESS NOTE ADULT - PROBLEM SELECTOR PLAN 8
DVT ppx: Lovenox    Called patient's PCP Dr. Morenita Carroll on 5/6/21. Spoke with staff. awaiting for callback.    Plan discussed with patient, sister Ghada, and medicine NP Vivian. DVT ppx: Lovenox    Called patient's PCP Dr. Morenita Carroll on 5/6/21. Spoke with staff. awaiting for callback.    Plan discussed with patient, sister Ghada, ID attending Dr. Mullins, and medicine NP Vivian.

## 2021-05-06 NOTE — PROGRESS NOTE ADULT - SUBJECTIVE AND OBJECTIVE BOX
[facility] Division of Hospital Medicine  Morenita Mcghee MD  Pager (M-F, 8A-9R): [phone]  Other Times:  [phone]      Patient is a 75y old  Female who presents with a chief complaint of S/P presumed mechanical fall (06 May 2021 11:12)      SUBJECTIVE / OVERNIGHT EVENTS: no acute events overnight. no fever, chills, chest pain, dyspnea, cough, abd pain, n/v nor dysuria. mild tenderness on her forehead where her contusion is, but overall without complaints. pleasantly confused.  ADDITIONAL REVIEW OF SYSTEMS:    MEDICATIONS  (STANDING):  aspirin enteric coated 81 milliGRAM(s) Oral daily  carvedilol 3.125 milliGRAM(s) Oral every 12 hours  cholecalciferol 1000 Unit(s) Oral daily  cyanocobalamin 1000 MICROGram(s) Oral daily  enoxaparin Injectable 40 milliGRAM(s) SubCutaneous daily  ezetimibe 10 milliGRAM(s) Oral at bedtime  famotidine    Tablet 20 milliGRAM(s) Oral two times a day  furosemide   Injectable 20 milliGRAM(s) IV Push daily  lovastatin 20 milliGRAM(s) Oral at bedtime  piperacillin/tazobactam IVPB.. 3.375 Gram(s) IV Intermittent every 8 hours  sertraline 100 milliGRAM(s) Oral daily  vancomycin  IVPB      vancomycin  IVPB 1000 milliGRAM(s) IV Intermittent every 12 hours    MEDICATIONS  (PRN):  acetaminophen   Tablet .. 650 milliGRAM(s) Oral every 6 hours PRN Temp greater or equal to 38C (100.4F), Mild Pain (1 - 3)      CAPILLARY BLOOD GLUCOSE        I&O's Summary      PHYSICAL EXAM:  Vital Signs Last 24 Hrs  T(C): 36.8 (06 May 2021 11:37), Max: 36.8 (06 May 2021 03:13)  T(F): 98.3 (06 May 2021 11:37), Max: 98.3 (06 May 2021 03:13)  HR: 95 (06 May 2021 11:37) (85 - 100)  BP: 109/57 (06 May 2021 11:37) (109/57 - 170/90)  BP(mean): 89 (06 May 2021 01:10) (76 - 97)  RR: 18 (06 May 2021 11:37) (16 - 20)  SpO2: 96% (06 May 2021 11:37) (95% - 100%)    CONSTITUTIONAL: obese elderly female in NAD  HEAD: +contusion with ecchymoses over R forehead  EYES: PERRLA; +periorbital ecchymoses R>L  ENMT: Moist oral mucosa, +nasal bridge with suture  NECK: Supple, no palpable masses; no thyromegaly  RESPIRATORY: Normal respiratory effort; lungs are clear to auscultation bilaterally though distant due to body habitus  CARDIOVASCULAR: Regular rate and rhythm, normal S1 and S2, no murmur/rub/gallop; 2+ pitting edema b/l; Peripheral pulses are 2+ bilaterally  ABDOMEN: Soft, Nondistended,  Nontender to palpation, normoactive bowel sounds  MUSCULOSKELETAL:  No clubbing or cyanosis of digits; no calf tenderness to palpation, b/l LE with erythema and warmth to level of knees  PSYCH: A+O to person, place only, unable to provide year nor month  NEUROLOGY: No gross sensory deficits, moving all extremities  SKIN: +periorbital ecchymoses R>L, +b/l lower extremities with significant warmth and erythema to level of knees    LABS:                        9.5    7.07  )-----------( 317      ( 06 May 2021 10:11 )             30.2     05-    139  |  101  |  17  ----------------------------<  106<H>  4.0   |  26  |  1.07    Ca    8.6      06 May 2021 10:11  Phos  3.3     05-06  Mg     2.1     05-06    TPro  6.7  /  Alb  3.8  /  TBili  0.2  /  DBili  x   /  AST  21  /  ALT  15  /  AlkPhos  88  05-05    PT/INR - ( 06 May 2021 10:11 )   PT: 13.4 sec;   INR: 1.12 ratio          Urinalysis Basic - ( 05 May 2021 22:57 )    Color: Light Yellow / Appearance: Clear / S.019 / pH: x  Gluc: x / Ketone: Negative  / Bili: Negative / Urobili: Negative   Blood: x / Protein: Negative / Nitrite: Negative   Leuk Esterase: Negative / RBC: x / WBC x   Sq Epi: x / Non Sq Epi: x / Bacteria: x          RADIOLOGY & ADDITIONAL TESTS:  Results Reviewed: no leukocytosis, H/H stable, Cr within normal limits, UA negative for infection  Imaging Personally Reviewed:  Electrocardiogram Personally Reviewed:    COORDINATION OF CARE:  Care Discussed with Consultants/Other Providers [Y]: medicine NP Vivian  Prior or Outpatient Records Reviewed [Y]: Plastics consult note, outpatient PCP office Dr. Carroll

## 2021-05-07 DIAGNOSIS — D50.9 IRON DEFICIENCY ANEMIA, UNSPECIFIED: ICD-10-CM

## 2021-05-07 DIAGNOSIS — I48.91 UNSPECIFIED ATRIAL FIBRILLATION: ICD-10-CM

## 2021-05-07 LAB
ANION GAP SERPL CALC-SCNC: 14 MMOL/L — SIGNIFICANT CHANGE UP (ref 5–17)
BASOPHILS # BLD AUTO: 0.08 K/UL — SIGNIFICANT CHANGE UP (ref 0–0.2)
BASOPHILS NFR BLD AUTO: 0.9 % — SIGNIFICANT CHANGE UP (ref 0–2)
BUN SERPL-MCNC: 14 MG/DL — SIGNIFICANT CHANGE UP (ref 7–23)
CALCIUM SERPL-MCNC: 8.6 MG/DL — SIGNIFICANT CHANGE UP (ref 8.4–10.5)
CHLORIDE SERPL-SCNC: 102 MMOL/L — SIGNIFICANT CHANGE UP (ref 96–108)
CO2 SERPL-SCNC: 25 MMOL/L — SIGNIFICANT CHANGE UP (ref 22–31)
COVID-19 SPIKE DOMAIN AB INTERP: POSITIVE
COVID-19 SPIKE DOMAIN ANTIBODY RESULT: 187 U/ML — HIGH
CREAT SERPL-MCNC: 0.99 MG/DL — SIGNIFICANT CHANGE UP (ref 0.5–1.3)
EOSINOPHIL # BLD AUTO: 0.38 K/UL — SIGNIFICANT CHANGE UP (ref 0–0.5)
EOSINOPHIL NFR BLD AUTO: 4.5 % — SIGNIFICANT CHANGE UP (ref 0–6)
GLUCOSE SERPL-MCNC: 97 MG/DL — SIGNIFICANT CHANGE UP (ref 70–99)
HCT VFR BLD CALC: 30.2 % — LOW (ref 34.5–45)
HGB BLD-MCNC: 9.4 G/DL — LOW (ref 11.5–15.5)
IMM GRANULOCYTES NFR BLD AUTO: 0.4 % — SIGNIFICANT CHANGE UP (ref 0–1.5)
LYMPHOCYTES # BLD AUTO: 1.93 K/UL — SIGNIFICANT CHANGE UP (ref 1–3.3)
LYMPHOCYTES # BLD AUTO: 22.9 % — SIGNIFICANT CHANGE UP (ref 13–44)
MAGNESIUM SERPL-MCNC: 2.1 MG/DL — SIGNIFICANT CHANGE UP (ref 1.6–2.6)
MCHC RBC-ENTMCNC: 24.7 PG — LOW (ref 27–34)
MCHC RBC-ENTMCNC: 31.1 GM/DL — LOW (ref 32–36)
MCV RBC AUTO: 79.5 FL — LOW (ref 80–100)
MONOCYTES # BLD AUTO: 0.85 K/UL — SIGNIFICANT CHANGE UP (ref 0–0.9)
MONOCYTES NFR BLD AUTO: 10.1 % — SIGNIFICANT CHANGE UP (ref 2–14)
MRSA PCR RESULT.: SIGNIFICANT CHANGE UP
NEUTROPHILS # BLD AUTO: 5.16 K/UL — SIGNIFICANT CHANGE UP (ref 1.8–7.4)
NEUTROPHILS NFR BLD AUTO: 61.2 % — SIGNIFICANT CHANGE UP (ref 43–77)
NRBC # BLD: 0 /100 WBCS — SIGNIFICANT CHANGE UP (ref 0–0)
PHOSPHATE SERPL-MCNC: 2.9 MG/DL — SIGNIFICANT CHANGE UP (ref 2.5–4.5)
PLATELET # BLD AUTO: 331 K/UL — SIGNIFICANT CHANGE UP (ref 150–400)
POTASSIUM SERPL-MCNC: 3.6 MMOL/L — SIGNIFICANT CHANGE UP (ref 3.5–5.3)
POTASSIUM SERPL-SCNC: 3.6 MMOL/L — SIGNIFICANT CHANGE UP (ref 3.5–5.3)
RBC # BLD: 3.8 M/UL — SIGNIFICANT CHANGE UP (ref 3.8–5.2)
RBC # FLD: 15.9 % — HIGH (ref 10.3–14.5)
S AUREUS DNA NOSE QL NAA+PROBE: SIGNIFICANT CHANGE UP
SARS-COV-2 IGG+IGM SERPL QL IA: 187 U/ML — HIGH
SARS-COV-2 IGG+IGM SERPL QL IA: POSITIVE
SODIUM SERPL-SCNC: 141 MMOL/L — SIGNIFICANT CHANGE UP (ref 135–145)
WBC # BLD: 8.43 K/UL — SIGNIFICANT CHANGE UP (ref 3.8–10.5)
WBC # FLD AUTO: 8.43 K/UL — SIGNIFICANT CHANGE UP (ref 3.8–10.5)

## 2021-05-07 PROCEDURE — 99233 SBSQ HOSP IP/OBS HIGH 50: CPT | Mod: GC

## 2021-05-07 PROCEDURE — 99231 SBSQ HOSP IP/OBS SF/LOW 25: CPT

## 2021-05-07 PROCEDURE — 93010 ELECTROCARDIOGRAM REPORT: CPT

## 2021-05-07 PROCEDURE — 99233 SBSQ HOSP IP/OBS HIGH 50: CPT

## 2021-05-07 RX ORDER — POTASSIUM CHLORIDE 20 MEQ
40 PACKET (EA) ORAL ONCE
Refills: 0 | Status: COMPLETED | OUTPATIENT
Start: 2021-05-07 | End: 2021-05-07

## 2021-05-07 RX ORDER — FUROSEMIDE 40 MG
20 TABLET ORAL
Refills: 0 | Status: DISCONTINUED | OUTPATIENT
Start: 2021-05-07 | End: 2021-05-10

## 2021-05-07 RX ORDER — METOPROLOL TARTRATE 50 MG
25 TABLET ORAL EVERY 12 HOURS
Refills: 0 | Status: DISCONTINUED | OUTPATIENT
Start: 2021-05-07 | End: 2021-05-09

## 2021-05-07 RX ORDER — METOPROLOL TARTRATE 50 MG
25 TABLET ORAL EVERY 12 HOURS
Refills: 0 | Status: DISCONTINUED | OUTPATIENT
Start: 2021-05-07 | End: 2021-05-07

## 2021-05-07 RX ORDER — METOPROLOL TARTRATE 50 MG
5 TABLET ORAL ONCE
Refills: 0 | Status: COMPLETED | OUTPATIENT
Start: 2021-05-07 | End: 2021-05-07

## 2021-05-07 RX ADMIN — FAMOTIDINE 20 MILLIGRAM(S): 10 INJECTION INTRAVENOUS at 17:51

## 2021-05-07 RX ADMIN — FAMOTIDINE 20 MILLIGRAM(S): 10 INJECTION INTRAVENOUS at 05:13

## 2021-05-07 RX ADMIN — Medication 20 MILLIGRAM(S): at 22:39

## 2021-05-07 RX ADMIN — Medication 1000 UNIT(S): at 12:54

## 2021-05-07 RX ADMIN — EZETIMIBE 10 MILLIGRAM(S): 10 TABLET ORAL at 22:39

## 2021-05-07 RX ADMIN — Medication 5 MILLIGRAM(S): at 08:14

## 2021-05-07 RX ADMIN — Medication 25 MILLIGRAM(S): at 22:39

## 2021-05-07 RX ADMIN — Medication 5 MILLIGRAM(S): at 06:52

## 2021-05-07 RX ADMIN — PREGABALIN 1000 MICROGRAM(S): 225 CAPSULE ORAL at 12:54

## 2021-05-07 RX ADMIN — CARVEDILOL PHOSPHATE 3.12 MILLIGRAM(S): 80 CAPSULE, EXTENDED RELEASE ORAL at 05:14

## 2021-05-07 RX ADMIN — Medication 25 MILLIGRAM(S): at 10:02

## 2021-05-07 RX ADMIN — Medication 650 MILLIGRAM(S): at 07:55

## 2021-05-07 RX ADMIN — SERTRALINE 100 MILLIGRAM(S): 25 TABLET, FILM COATED ORAL at 12:55

## 2021-05-07 RX ADMIN — Medication 20 MILLIGRAM(S): at 05:14

## 2021-05-07 RX ADMIN — ENOXAPARIN SODIUM 40 MILLIGRAM(S): 100 INJECTION SUBCUTANEOUS at 12:55

## 2021-05-07 RX ADMIN — Medication 40 MILLIEQUIVALENT(S): at 12:55

## 2021-05-07 RX ADMIN — Medication 20 MILLIGRAM(S): at 17:59

## 2021-05-07 RX ADMIN — Medication 81 MILLIGRAM(S): at 12:54

## 2021-05-07 RX ADMIN — Medication 650 MILLIGRAM(S): at 08:25

## 2021-05-07 RX ADMIN — NYSTATIN CREAM 1 APPLICATION(S): 100000 CREAM TOPICAL at 05:14

## 2021-05-07 NOTE — CONSULT NOTE ADULT - ATTENDING COMMENTS
minimally displaced nasal bone fx. no deformity. no septal hematoma.   No intervention indicated.    Dorsal nasal lac s/p closure. No evidence of infection.     F/u as outpt
75 year old woman presents with fall, facial trauma and relates that has had multiple falls. She thinks she tripped, however observe that she landed on her face, no effort to break fall. Initially in sinus rhythm but after admission onset of atrial fibrillation. This constellation of findings is highly suspicious for syncope related to conversion pauses when atrial fibrillation reverts to sinus. Monitor on telemetry to document and obtain cardiac echo Doppler. Anticoagulation will be indicated, but should defer due to acute head trauma.    To contact call Cardiology Fellow or Attending as listed on [email or web address] password: [email or web address].

## 2021-05-07 NOTE — CONSULT NOTE ADULT - ASSESSMENT
75yoF s/p mechanical ground level fall w/ minimally displaced nasal bone fracture    - No acute plastic surgery intervention needed  - Will suture small laceration on bridge of nose at bedside  - For swelling okay to ice face 20 mins on 1 hour off and please keep HOB elevated  - please follow up with Dr Parkinson 1-2 weeks from discharge.    Gaston Keller  Plastic Surgery Resident  [facility]: [phone]  [facility]: [id number]
 75 y /o F with hx of Alzheimers, HTN, HLD, obesity, b/l cellulitis presents with a mechanical fall with nasal fracture and R scalp contusion. Cardiology c/s for new onset Afib.    #Afib- possible that these are episodes of paroxysmal Afib that result in conversion pauses to sinus rhythm which may lead to her frequent falls. CHADsVasc   -continue metoprolol 25mg BID and uptitrate as needed to achieve HR <110. Currently HR 100s-110s s/p IV lopressor this AM  - CHADsVasc of at least 4. Ideally should be on anticoagulation given elevated stroke risk but given frequent falls (along with recent fall with head injury) risks and benefits should be discussed with patient and patient's family.  -obtain TTE given LE edema and elevated BNP  -continue tele monitoring to determine whether patient shows conversion pauses      Liza Harris MD  Cardiology Fellow PGY-4  [phone]  All Cardiology service information can be found 24/7 on [email or web address], password: [email or web address]
75 year old female PMH Dementia, HTN, HLD, Obesity who presents to [facility] on 5/5 following a fall. Reportedly being treated for "bilateral lower extremity cellulitis" prior to presentation.     U/A (5/5) with no pyuria.   COVID19 PCR (5/5) Negative.      CXR (5/5) with Diffuse bilateral reticular opacities, likely pulmonary vascular congestion.   XR Tib-Fib (5/5) with No acute fracture or dislocation, Bilateral TKAs without complication. Bilateral lower leg edema.   CT A/P (5/5) with No CT evidence of acute visceral or osseous injury in the abdomen and pelvis.    At this point LE exam consistent with changes of chronic venous stasis  Patient also with no leukocytosis or fever on presentation  I would monitor off of antibiotics  Can check MRSA/MSSA PCR for surveillance purposes    Overall, Rash (LE erythema), Fall    I will continue to follow. Please feel free to contact me with any further questions.    Jimenez Mullins M.D.  [facility] Division of Infectious Disease  8AM-5PM: Pager Number [phone]  After Hours (or if no response): Please contact the Infectious Diseases Office at [phone]     The above assessment and plan were discussed with Dr Mcghee

## 2021-05-07 NOTE — PROGRESS NOTE ADULT - PROBLEM SELECTOR PLAN 2
patient with progressively worsening lower extrema edema. per sister, no history/diagnosis of CHF.   - per PCP office and sister patient presented to PCP on 4/28/21 with worsening lower extremity pain and swelling. was prescribed bactrim DS x 7 days which she started on 4/28 with no improvement, prompting her to return to PCP on 5/5 (where she unfortunately had fall in the office)  - discussed with ID attending Dr. Mullins, her lower extremity erythema looks more consistent with venous stasis changes. afebrile with no leukocytosis. thus antibiotics discontinued on 5/6.  - concern for possible HF as also has crackles on exam. no prior dx. or possible paroxysmal afib with RVR leading to flash pulmonary edema.  - BNP mildly elevated 617  - increased lasix to 20mg IV BID today - she is lasix naive with good response to 20mg IV dose  - strict I/Os, daily weights  - f/u TTE which is still pending  - duplex negative for DVT b/l  - continue to follow blood cultures, which show NGTD

## 2021-05-07 NOTE — PROGRESS NOTE ADULT - SUBJECTIVE AND OBJECTIVE BOX
[facility] Division of Hospital Medicine  Morenita Mcghee MD  Pager (M-F, 8A-5P): [phone]  Other Times:  [phone]      Patient is a 75y old  Female who presents with a chief complaint of S/P presumed mechanical fall (07 May 2021 10:57)      SUBJECTIVE / OVERNIGHT EVENTS: went into afib with RVR early this morning requiring lopressor IVP. transferred to telemetry unit. patient denies any dizziness, lightheadedness, chest pain, palpitations nor dyspnea. still pleasantly forgetful. denies any pain nor discomfort at time of my exam.  ADDITIONAL REVIEW OF SYSTEMS:    MEDICATIONS  (STANDING):  aspirin enteric coated 81 milliGRAM(s) Oral daily  cholecalciferol 1000 Unit(s) Oral daily  cyanocobalamin 1000 MICROGram(s) Oral daily  enoxaparin Injectable 40 milliGRAM(s) SubCutaneous daily  ezetimibe 10 milliGRAM(s) Oral at bedtime  famotidine    Tablet 20 milliGRAM(s) Oral two times a day  furosemide   Injectable 20 milliGRAM(s) IV Push two times a day  lovastatin 20 milliGRAM(s) Oral at bedtime  metoprolol tartrate 25 milliGRAM(s) Oral every 12 hours  sertraline 100 milliGRAM(s) Oral daily    MEDICATIONS  (PRN):  acetaminophen   Tablet .. 650 milliGRAM(s) Oral every 6 hours PRN Temp greater or equal to 38C (100.4F), Mild Pain (1 - 3)  QUEtiapine 12.5 milliGRAM(s) Oral at bedtime PRN agitation or insomnia      CAPILLARY BLOOD GLUCOSE        I&O's Summary    06 May 2021 07:  -  07 May 2021 07:00  --------------------------------------------------------  IN: 730 mL / OUT: 2300 mL / NET: -1570 mL    07 May 2021 07:01  -  07 May 2021 11:30  --------------------------------------------------------  IN: 0 mL / OUT: 850 mL / NET: -850 mL        PHYSICAL EXAM:  Vital Signs Last 24 Hrs  T(C): 37.2 (07 May 2021 08:51), Max: 37.5 (07 May 2021 00:31)  T(F): 98.9 (07 May 2021 08:51), Max: 99.5 (07 May 2021 00:31)  HR: 128 (07 May 2021 09:49) (89 - 147)  BP: 114/76 (07 May 2021 09:49) (102/61 - 150/81)  BP(mean): --  RR: 16 (07 May 2021 08:51) (16 - 20)  SpO2: 95% (07 May 2021 08:51) (93% - 97%)    CONSTITUTIONAL: obese elderly female in NAD  HEAD: +contusion with ecchymoses over R forehead - unchanged  EYES: PERRLA; +periorbital ecchymoses R>L - healing   ENMT: Moist oral mucosa, +nasal bridge with suture  NECK: Supple, no palpable masses; no thyromegaly  RESPIRATORY: Normal respiratory effort; lungs are clear to auscultation bilaterally though distant due to body habitus  CARDIOVASCULAR: irregularly irregular, normal S1 and S2, no murmur/rub/gallop; 2+ pitting edema b/l; Peripheral pulses are 2+ bilaterally  ABDOMEN: Soft, Nondistended,  Nontender to palpation, normoactive bowel sounds  MUSCULOSKELETAL:  No clubbing or cyanosis of digits; no calf tenderness to palpation, b/l LE with erythema and warmth to level of knees  PSYCH: A+O to person, place only, still unable to provide year nor month  NEUROLOGY: No gross sensory deficits, moving all extremities  SKIN: +periorbital ecchymoses R>L, +healing ecchymoses on b/l anterior knees, +b/l lower extremities with light pink erythema now level of mid shins (improved from to level of knees yesterday)  LABS:                        9.4    8.43  )-----------( 331      ( 07 May 2021 07:17 )             30.2     05-07    141  |  102  |  14  ----------------------------<  97  3.6   |  25  |  0.99    Ca    8.6      07 May 2021 07:17  Phos  2.9     -  Mg     2.1     -    TPro  6.7  /  Alb  3.8  /  TBili  0.2  /  DBili  x   /  AST  21  /  ALT  15  /  AlkPhos  88  -    PT/INR - ( 06 May 2021 10:11 )   PT: 13.4 sec;   INR: 1.12 ratio               Urinalysis Basic - ( 05 May 2021 22:57 )    Color: Light Yellow / Appearance: Clear / S.019 / pH: x  Gluc: x / Ketone: Negative  / Bili: Negative / Urobili: Negative   Blood: x / Protein: Negative / Nitrite: Negative   Leuk Esterase: Negative / RBC: x / WBC x   Sq Epi: x / Non Sq Epi: x / Bacteria: x        Culture - Blood (collected 06 May 2021 04:15)  Source: .Blood Blood-Venous  Preliminary Report (07 May 2021 05:01):    No growth to date.    Culture - Blood (collected 06 May 2021 04:15)  Source: .Blood Blood-Venous  Preliminary Report (07 May 2021 05:01):    No growth to date.      RADIOLOGY & ADDITIONAL TESTS:  Results Reviewed: no leukocytosis, stable anemia, stable Cr, mild hypokalemia  Imaging Personally Reviewed:  Electrocardiogram Personally Reviewed:    COORDINATION OF CARE:  Care Discussed with Consultants/Other Providers [Y]: medicine NP Herb  Prior or Outpatient Records Reviewed [Y]: Infectious Disease, Cardiology progress notes

## 2021-05-07 NOTE — CHART NOTE - NSCHARTNOTEFT_GEN_A_CORE
[facility] MEDICINE NOTE:    RN notified at 5:42AM pt has irregular heart rate, 140 radial irregular.   Pt seen at bedside,  a+ox1-2, denies chest pain, shortness of breath, or palpitation. Pulse is irregular.   Pt is on Coreg 3.125 Q12 hours.   EKG STAT ordered>> A fib with RVR w/ .  Spoke with night hospitalist  and recommended to give IV Metoprolol 5mg.   Consider tele floor?  Endorsed to day shift and will monitor HR and will decide if to transfer to tele.   Repeat HR in 110's.     Kaila Khanna  Dept of Medicine [facility] MEDICINE NOTE:    RN notified at 5:42AM pt has irregular heart rate, 140 radial irregular.   Pt seen at bedside,  a+ox1-2, denies chest pain, shortness of breath, or palpitation. Pulse is irregular.   Pt is on Coreg 3.125 Q12 hours.   EKG STAT ordered>> A fib with RVR w/ .  Spoke with night hospitalist  and recommended to give IV Metoprolol 5mg.   Consider tele floor?  Consider cards consult  Endorsed to day shift and will monitor HR and will decide if to transfer to tele.   Repeat HR in 110's.       Vital Signs Last 24 Hrs  T(C): 37.1 (07 May 2021 05:07), Max: 37.5 (07 May 2021 00:31)  T(F): 98.7 (07 May 2021 05:07), Max: 99.5 (07 May 2021 00:31)  HR: 147 (07 May 2021 07:24) (89 - 147)  BP: 103/69 (07 May 2021 07:24) (102/61 - 150/81)  RR: 18 (07 May 2021 05:07) (18 - 20)  SpO2: 97% (07 May 2021 05:07) (93% - 97%)        Kaila Khanna  Dept of Medicine

## 2021-05-07 NOTE — PROGRESS NOTE ADULT - PROBLEM SELECTOR PLAN 6
- change home carvedilol 3.125mg q12h to metoprolol as above  - hold HCTZ 12.5mg PO daily as started recently for swelling, not HTN  - monitor vitals

## 2021-05-07 NOTE — PROGRESS NOTE ADULT - PROBLEM SELECTOR PLAN 1
new onset afib with RVR this morning requiring IV metoprolol  - CHADSVASc 4  - would not be ideal candidate for a/c at this time as per my conversation with her sister Ghada, she suffers from frequent falls at home and is coming to us here with again fall (this fall was mechanical and witnessed by her sister, patient was rushing into PCP office and tripped)  - change home carvedilol 3.125mg q12h to metoprolol tartrate 25mg PO q12h for goal HR<110 as BP has been soft  - will increase frequency or dose if needed for rate control  - TTE pending, f/u results  - Cardiology consult recs appreciated new onset afib with RVR this morning requiring IV metoprolol  - CHADSVASc 4  - would not be ideal candidate for a/c at this time as per my conversation with her sister Ghada, she suffers from frequent falls at home and is coming to us here with again fall (this fall was mechanical and witnessed by her sister, patient was rushing into PCP office and tripped)  - discussed re: a/c with sister Ghada who is in agreement that patient has falls almost daily (mostly when she tries to get out of bed) and the risks of bleeding outweighs the benefits of at this time.  - change home carvedilol 3.125mg q12h to metoprolol tartrate 25mg PO q12h for goal HR<110 as BP has been soft  - will increase frequency or dose if needed for rate control  - TTE pending, f/u results  - Cardiology consult recs appreciated

## 2021-05-07 NOTE — PROGRESS NOTE ADULT - SUBJECTIVE AND OBJECTIVE BOX
Follow Up:  Rash    Interval History: afebrile overnight. A-Fib with RVR overnight.     REVIEW OF SYSTEMS  [  ] ROS unobtainable because:    [  ] All other systems negative except as noted below    Constitutional:  [ ] fever [ ] chills  [ ] weight loss  [ ] weakness  Skin:  [ ] rash [ ] phlebitis	  Eyes: [ ] icterus [ ] pain  [ ] discharge	  ENMT: [ ] sore throat  [ ] thrush [ ] ulcers [ ] exudates  Respiratory: [ ] dyspnea [ ] hemoptysis [ ] cough [ ] sputum	  Cardiovascular:  [ ] chest pain [ ] palpitations [ ] edema	  Gastrointestinal:  [ ] nausea [ ] vomiting [ ] diarrhea [ ] constipation [ ] pain	  Genitourinary:  [ ] dysuria [ ] frequency [ ] hematuria [ ] discharge [ ] flank pain  [ ] incontinence  Musculoskeletal:  [ ] myalgias [ ] arthralgias [ ] arthritis  [ ] back pain  Neurological:  [ ] headache [ ] seizures  [ ] confusion/altered mental status    Allergies  penicillin (Rash)        ANTIMICROBIALS:      OTHER MEDS:  MEDICATIONS  (STANDING):  acetaminophen   Tablet .. 650 every 6 hours PRN  aspirin enteric coated 81 daily  enoxaparin Injectable 40 daily  ezetimibe 10 at bedtime  famotidine    Tablet 20 two times a day  furosemide   Injectable 20 two times a day  lovastatin 20 at bedtime  metoprolol tartrate 25 every 12 hours  QUEtiapine 12.5 at bedtime PRN  sertraline 100 daily      Vital Signs Last 24 Hrs  T(C): 37.2 (07 May 2021 08:51), Max: 37.5 (07 May 2021 00:31)  T(F): 98.9 (07 May 2021 08:51), Max: 99.5 (07 May 2021 00:31)  HR: 128 (07 May 2021 09:49) (89 - 147)  BP: 114/76 (07 May 2021 09:49) (102/61 - 150/81)  BP(mean): --  RR: 16 (07 May 2021 08:51) (16 - 20)  SpO2: 95% (07 May 2021 08:51) (93% - 97%)    PHYSICAL EXAMINATION:  General: Alert and Awake, NAD  HEENT: PERRL, EOMI  Neck: Supple  Cardiac: RRR, No M/R/G  Resp: CTAB, No Wh/Rh/Ra  Abdomen: NBS, NT/ND, No HSM, No rigidity or guarding  MSK: No LE edema. No Calf tenderness  : No garcia  Skin: No rashes or lesions. Skin is warm and dry to the touch.   Neuro: Alert and Awake. CN 2-12 Grossly intact. Moves all four extremities spontaneously.  Psych: Calm, Pleasant, Cooperative                          9.4    8.43  )-----------( 331      ( 07 May 2021 07:17 )             30.2           141  |  102  |  14  ----------------------------<  97  3.6   |  25  |  0.99    Ca    8.6      07 May 2021 07:17  Phos  2.9     -  Mg     2.1         TPro  6.7  /  Alb  3.8  /  TBili  0.2  /  DBili  x   /  AST  21  /  ALT  15  /  AlkPhos  88  -      Urinalysis Basic - ( 05 May 2021 22:57 )    Color: Light Yellow / Appearance: Clear / S.019 / pH: x  Gluc: x / Ketone: Negative  / Bili: Negative / Urobili: Negative   Blood: x / Protein: Negative / Nitrite: Negative   Leuk Esterase: Negative / RBC: x / WBC x   Sq Epi: x / Non Sq Epi: x / Bacteria: x        MICROBIOLOGY:  v  .Blood Blood-Venous  21   No growth to date.  --  --                RADIOLOGY:    <The imaging below has been reviewed and visualized by me independently. Findings as detailed in report below> Follow Up:  Rash    Interval History: afebrile overnight. A-Fib with RVR overnight.     REVIEW OF SYSTEMS  [  ] ROS unobtainable because:    [  ] All other systems negative except as noted below    Constitutional:  [ ] fever [ ] chills  [ ] weight loss  [ ] weakness  Skin:  [ ] rash [ ] phlebitis	  Eyes: [ ] icterus [ ] pain  [ ] discharge	  ENMT: [ ] sore throat  [ ] thrush [ ] ulcers [ ] exudates  Respiratory: [ ] dyspnea [ ] hemoptysis [ ] cough [ ] sputum	  Cardiovascular:  [ ] chest pain [ ] palpitations [ ] edema	  Gastrointestinal:  [ ] nausea [ ] vomiting [ ] diarrhea [ ] constipation [ ] pain	  Genitourinary:  [ ] dysuria [ ] frequency [ ] hematuria [ ] discharge [ ] flank pain  [ ] incontinence  Musculoskeletal:  [ ] myalgias [ ] arthralgias [ ] arthritis  [ ] back pain  Neurological:  [ ] headache [ ] seizures  [ ] confusion/altered mental status    Allergies  penicillin (Rash)        ANTIMICROBIALS:      OTHER MEDS:  MEDICATIONS  (STANDING):  acetaminophen   Tablet .. 650 every 6 hours PRN  aspirin enteric coated 81 daily  enoxaparin Injectable 40 daily  ezetimibe 10 at bedtime  famotidine    Tablet 20 two times a day  furosemide   Injectable 20 two times a day  lovastatin 20 at bedtime  metoprolol tartrate 25 every 12 hours  QUEtiapine 12.5 at bedtime PRN  sertraline 100 daily      Vital Signs Last 24 Hrs  T(C): 37.2 (07 May 2021 08:51), Max: 37.5 (07 May 2021 00:31)  T(F): 98.9 (07 May 2021 08:51), Max: 99.5 (07 May 2021 00:31)  HR: 128 (07 May 2021 09:49) (89 - 147)  BP: 114/76 (07 May 2021 09:49) (102/61 - 150/81)  BP(mean): --  RR: 16 (07 May 2021 08:51) (16 - 20)  SpO2: 95% (07 May 2021 08:51) (93% - 97%)    PHYSICAL EXAMINATION:  General: Alert and Awake, NAD  HEENT: PERRL, EOMI  Neck: Supple  Cardiac: RRR, No M/R/G  Resp: CTAB, No Wh/Rh/Ra  Abdomen: NBS, NT/ND, No HSM, No rigidity or guarding  MSK: No LE edema. No Calf tenderness  : No garcia  Skin: No rashes or lesions. Skin is warm and dry to the touch.   Neuro: Alert and Awake. CN 2-12 Grossly intact. Moves all four extremities spontaneously.  Psych: Calm, Pleasant, Cooperative                          9.4    8.43  )-----------( 331      ( 07 May 2021 07:17 )             30.2       -    141  |  102  |  14  ----------------------------<  97  3.6   |  25  |  0.99    Ca    8.6      07 May 2021 07:17  Phos  2.9     -  Mg     2.1     -    TPro  6.7  /  Alb  3.8  /  TBili  0.2  /  DBili  x   /  AST  21  /  ALT  15  /  AlkPhos  88  -      Urinalysis Basic - ( 05 May 2021 22:57 )    Color: Light Yellow / Appearance: Clear / S.019 / pH: x  Gluc: x / Ketone: Negative  / Bili: Negative / Urobili: Negative   Blood: x / Protein: Negative / Nitrite: Negative   Leuk Esterase: Negative / RBC: x / WBC x   Sq Epi: x / Non Sq Epi: x / Bacteria: x        MICROBIOLOGY:  v  .Blood Blood-Venous  21   No growth to date.  --  --    RADIOLOGY:    <The imaging below has been reviewed and visualized by me independently. Findings as detailed in report below>    EXAM:  DUPLEX SCAN EXT VEINS LOWER BI                      PROCEDURE DATE:  2021    No evidence of deep venous thrombosis in either lower extremity. Follow Up:  Rash    Interval History: afebrile overnight. A-Fib with RVR overnight.     REVIEW OF SYSTEMS  [  ] ROS unobtainable because:    [ x ] All other systems negative except as noted below    Constitutional:  [ ] fever [ ] chills  [ ] weight loss  [ ] weakness  Skin:  [ ] rash [ ] phlebitis	  Eyes: [ ] icterus [ ] pain  [ ] discharge	  ENMT: [ ] sore throat  [ ] thrush [ ] ulcers [ ] exudates  Respiratory: [ ] dyspnea [ ] hemoptysis [ ] cough [ ] sputum	  Cardiovascular:  [ ] chest pain [ ] palpitations [ ] edema	  Gastrointestinal:  [ ] nausea [ ] vomiting [ ] diarrhea [ ] constipation [ ] pain	  Genitourinary:  [ ] dysuria [ ] frequency [ ] hematuria [ ] discharge [ ] flank pain  [ ] incontinence  Musculoskeletal:  [ ] myalgias [ ] arthralgias [ ] arthritis  [ ] back pain  Neurological:  [ ] headache [ ] seizures  [ ] confusion/altered mental status    Allergies  penicillin (Rash)        ANTIMICROBIALS:      OTHER MEDS:  MEDICATIONS  (STANDING):  acetaminophen   Tablet .. 650 every 6 hours PRN  aspirin enteric coated 81 daily  enoxaparin Injectable 40 daily  ezetimibe 10 at bedtime  famotidine    Tablet 20 two times a day  furosemide   Injectable 20 two times a day  lovastatin 20 at bedtime  metoprolol tartrate 25 every 12 hours  QUEtiapine 12.5 at bedtime PRN  sertraline 100 daily      Vital Signs Last 24 Hrs  T(C): 37.2 (07 May 2021 08:51), Max: 37.5 (07 May 2021 00:31)  T(F): 98.9 (07 May 2021 08:51), Max: 99.5 (07 May 2021 00:31)  HR: 128 (07 May 2021 09:49) (89 - 147)  BP: 114/76 (07 May 2021 09:49) (102/61 - 150/81)  BP(mean): --  RR: 16 (07 May 2021 08:51) (16 - 20)  SpO2: 95% (07 May 2021 08:51) (93% - 97%)    PHYSICAL EXAMINATION:  General: Alert and Awake, NAD  HEENT: PERRL, EOMI  Neck: Supple  Cardiac: RRR, No M/R/G  Resp: CTAB, No Wh/Rh/Ra  Abdomen: NBS, NT/ND, No HSM, No rigidity or guarding  MSK: No LE edema. No Calf tenderness  : No garcia  Skin: No rashes or lesions. Skin is warm and dry to the touch.   Neuro: Alert and Awake. CN 2-12 Grossly intact. Moves all four extremities spontaneously.  Psych: Calm, Pleasant, Cooperative                          9.4    8.43  )-----------( 331      ( 07 May 2021 07:17 )             30.2       -    141  |  102  |  14  ----------------------------<  97  3.6   |  25  |  0.99    Ca    8.6      07 May 2021 07:17  Phos  2.9     -  Mg     2.1     -    TPro  6.7  /  Alb  3.8  /  TBili  0.2  /  DBili  x   /  AST  21  /  ALT  15  /  AlkPhos  88  05-      Urinalysis Basic - ( 05 May 2021 22:57 )    Color: Light Yellow / Appearance: Clear / S.019 / pH: x  Gluc: x / Ketone: Negative  / Bili: Negative / Urobili: Negative   Blood: x / Protein: Negative / Nitrite: Negative   Leuk Esterase: Negative / RBC: x / WBC x   Sq Epi: x / Non Sq Epi: x / Bacteria: x        MICROBIOLOGY:  v  .Blood Blood-Venous  21   No growth to date.  --  --    RADIOLOGY:    <The imaging below has been reviewed and visualized by me independently. Findings as detailed in report below>    EXAM:  DUPLEX SCAN EXT VEINS LOWER BI                      PROCEDURE DATE:  2021    No evidence of deep venous thrombosis in either lower extremity.

## 2021-05-07 NOTE — PROGRESS NOTE ADULT - PROBLEM SELECTOR PLAN 9
DVT ppx: Lovenox    Discussed with patient's PCP Dr. Morenita Carroll on 5/6/21.    Plan discussed with patient and medicine NP Herb.  Spoke with sister Ghada on 5/6, called again today but no answer. left voicemail. will try again later. DVT ppx: Lovenox    Discussed with patient's PCP Dr. Morenita Carroll on 5/6/21.    Plan discussed with patient, sister Ghada via phone, and medicine NP Herb.

## 2021-05-07 NOTE — PROGRESS NOTE ADULT - PROBLEM SELECTOR PLAN 8
history of Alzheimer's dementia. baseline AOx 2  - holding namenda as a BEERS risk  - c/w sertraline 100mg daily  - c/w seroquel 12.5mg qHS PRN

## 2021-05-07 NOTE — CONSULT NOTE ADULT - SUBJECTIVE AND OBJECTIVE BOX
Patient seen and evaluated at bedside       75 y /o F with hx of Alzheimers, HTN, HLD, obesity, b/l cellulitis presents with a mechanical fall with nasal fracture and R scalp contusion. Cardiology c/s for new onset Afib.      PMHx:   Essential (primary) hypertension    Hyperlipidemia, unspecified hyperlipidemia type    Gastroesophageal reflux disease without esophagitis    Other type of osteoarthritis, unspecified site    Dementia with behavioral disturbance, unspecified dementia type    Hypertension, unspecified type    Hyperlipidemia, unspecified hyperlipidemia type    Obesity, unspecified classification, unspecified obesity type, unspecified whether serious comorbidity present        PSHx:   History of knee replacement, total, bilateral    Status post total bilateral knee replacement        Allergies:  penicillin (Rash)      Home Meds:    Current Medications:   acetaminophen   Tablet .. 650 milliGRAM(s) Oral every 6 hours PRN  aspirin enteric coated 81 milliGRAM(s) Oral daily  cholecalciferol 1000 Unit(s) Oral daily  cyanocobalamin 1000 MICROGram(s) Oral daily  enoxaparin Injectable 40 milliGRAM(s) SubCutaneous daily  ezetimibe 10 milliGRAM(s) Oral at bedtime  famotidine    Tablet 20 milliGRAM(s) Oral two times a day  furosemide   Injectable 20 milliGRAM(s) IV Push two times a day  lovastatin 20 milliGRAM(s) Oral at bedtime  metoprolol tartrate 25 milliGRAM(s) Oral every 12 hours  QUEtiapine 12.5 milliGRAM(s) Oral at bedtime PRN  sertraline 100 milliGRAM(s) Oral daily      FAMILY HISTORY:  Family history of cancer (Aunt)  one maternal aunt had pancreatic cancer and another maternal aunt had colon cancer    No pertinent family history in first degree relatives        Social History:  Smoking History:  Alcohol Use:  Drug Use:    REVIEW OF SYSTEMS:  Constitutional:     [ ] negative [ ] fevers [ ] chills [ ] weight loss [ ] weight gain  HEENT:                  [ ] negative [ ] dry eyes [ ] eye irritation [ ] postnasal drip [ ] nasal congestion  CV:                         [ ] negative  [ ] chest pain [ ] orthopnea [ ] palpitations [ ] murmur  Resp:                     [ ] negative [ ] cough [ ] shortness of breath [ ] dyspnea [ ] wheezing [ ] sputum [ ]hemoptysis  GI:                          [ ] negative [ ] nausea [ ] vomiting [ ] diarrhea [ ] constipation [ ] abd pain [ ] dysphagia   :                        [ ] negative [ ] dysuria [ ] nocturia [ ] hematuria [ ] increased urinary frequency  Musculoskeletal: [ ] negative [ ] back pain [ ] myalgias [ ] arthralgias [ ] fracture  Skin:                       [ ] negative [ ] rash [ ] itch  Neurological:        [ ] negative [ ] headache [ ] dizziness [ ] syncope [ ] weakness [ ] numbness  Psychiatric:           [ ] negative [ ] anxiety [ ] depression  Endocrine:            [ ] negative [ ] diabetes [ ] thyroid problem  Heme/Lymph:      [ ] negative [ ] anemia [ ] bleeding problem  Allergic/Immune: [ ] negative [ ] itchy eyes [ ] nasal discharge [ ] hives [ ] angioedema    [ ] All other systems negative  [ ] Unable to assess ROS due to      Physical Exam:  T(F): 98.9 (05-07), Max: 99.5 (05-07)  HR: 108 (05-07) (89 - 147)  BP: 135/85 (05-07) (102/61 - 150/81)  RR: 16 (05-07)  SpO2: 95% (05-07)  GENERAL: No acute distress, well-developed  HEAD:  Atraumatic, Normocephalic  ENT: EOMI, PERRLA, conjunctiva and sclera clear, Neck supple, No JVD, moist mucosa  CHEST/LUNG: Clear to auscultation bilaterally; No wheeze, equal breath sounds bilaterally   BACK: No spinal tenderness  HEART: Regular rate and rhythm; No murmurs, rubs, or gallops  ABDOMEN: Soft, Nontender, Nondistended; Bowel sounds present  EXTREMITIES:  No clubbing, cyanosis, or edema  PSYCH: Nl behavior, nl affect  NEUROLOGY: AAOx3, non-focal, cranial nerves intact  SKIN: Normal color, No rashes or lesions  LINES:    Cardiovascular Diagnostic Testing:    ECG: Personally reviewed:    Echo: Personally reviewed:    Stress Testing:    Cath:    Imaging:    CXR: Personally reviewed    Labs: Personally reviewed                        9.4    8.43  )-----------( 331      ( 07 May 2021 07:17 )             30.2     05-07    141  |  102  |  14  ----------------------------<  97  3.6   |  25  |  0.99    Ca    8.6      07 May 2021 07:17  Phos  2.9     05-07  Mg     2.1     05-07    TPro  6.7  /  Alb  3.8  /  TBili  0.2  /  DBili  x   /  AST  21  /  ALT  15  /  AlkPhos  88  05-05    PT/INR - ( 06 May 2021 10:11 )   PT: 13.4 sec;   INR: 1.12 ratio           Serum Pro-Brain Natriuretic Peptide: 617 pg/mL (05-05 @ 23:01)         Patient seen and evaluated at bedside       75 y /o F with hx of Alzheimers, HTN, HLD, obesity, b/l cellulitis presents with a mechanical fall with nasal fracture and R scalp contusion. Per notes, appears that patient has frequent falls at home. Attempted to call family at [phone] for further collateral but no response. Patient is AOx1 (to self only- states she is in [place] and month is March) and denies any known cardiac history to her knowledge. Denies chest pain or SOB. DEnies light headedness, palpitations. Cardiology c/s for new onset Afib.      PMHx:   Essential (primary) hypertension    Hyperlipidemia, unspecified hyperlipidemia type    Gastroesophageal reflux disease without esophagitis    Other type of osteoarthritis, unspecified site    Dementia with behavioral disturbance, unspecified dementia type    Hypertension, unspecified type    Hyperlipidemia, unspecified hyperlipidemia type    Obesity, unspecified classification, unspecified obesity type, unspecified whether serious comorbidity present        PSHx:   History of knee replacement, total, bilateral    Status post total bilateral knee replacement        Allergies:  penicillin (Rash)      Home Meds:    Current Medications:   acetaminophen   Tablet .. 650 milliGRAM(s) Oral every 6 hours PRN  aspirin enteric coated 81 milliGRAM(s) Oral daily  cholecalciferol 1000 Unit(s) Oral daily  cyanocobalamin 1000 MICROGram(s) Oral daily  enoxaparin Injectable 40 milliGRAM(s) SubCutaneous daily  ezetimibe 10 milliGRAM(s) Oral at bedtime  famotidine    Tablet 20 milliGRAM(s) Oral two times a day  furosemide   Injectable 20 milliGRAM(s) IV Push two times a day  lovastatin 20 milliGRAM(s) Oral at bedtime  metoprolol tartrate 25 milliGRAM(s) Oral every 12 hours  QUEtiapine 12.5 milliGRAM(s) Oral at bedtime PRN  sertraline 100 milliGRAM(s) Oral daily      FAMILY HISTORY:  Family history of cancer (Aunt)  one maternal aunt had pancreatic cancer and another maternal aunt had colon cancer    No pertinent family history in first degree relatives        Social History:  Smoking History:  Alcohol Use:  Drug Use:    REVIEW OF SYSTEMS:  Constitutional:     [x ] negative [ ] fevers [ ] chills [ ] weight loss [ ] weight gain  HEENT:                  [x ] negative [ ] dry eyes [ ] eye irritation [ ] postnasal drip [ ] nasal congestion  CV:                         [x ] negative  [ ] chest pain [ ] orthopnea [ ] palpitations [ ] murmur  Resp:                     [x ] negative [ ] cough [ ] shortness of breath [ ] dyspnea [ ] wheezing [ ] sputum [ ]hemoptysis  GI:                          [ x] negative [ ] nausea [ ] vomiting [ ] diarrhea [ ] constipation [ ] abd pain [ ] dysphagia   :                        [x ] negative [ ] dysuria [ ] nocturia [ ] hematuria [ ] increased urinary frequency  Musculoskeletal: [x ] negative [ ] back pain [ ] myalgias [ ] arthralgias [ ] fracture  Skin:                       [ x] negative [ ] rash [ ] itch      [x ] All other systems negative  [ ] Unable to assess ROS due to      Physical Exam:  T(F): 98.9 (05-07), Max: 99.5 (05-07)  HR: 108 (05-07) (89 - 147)  BP: 135/85 (05-07) (102/61 - 150/81)  RR: 16 (05-07)  SpO2: 95% (05-07)  GENERAL: No acute distress, well-developed  HEAD:  periorbital ecchymosis  ENT: EOMI, PERRLA, conjunctiva and sclera clear, Neck supple, No JVD, moist mucosa  CHEST/LUNG: Clear to auscultation bilaterally; No wheeze, equal breath sounds bilaterally   BACK: No spinal tenderness  HEART: irregular, tachycardic. 2/6 systolic murmur heard best at the RUSB and LUSB. Non radiating  ABDOMEN: Soft, Nontender, Nondistended; Bowel sounds present  EXTREMITIES:  +1 pitting edema in b/l LE  PSYCH: Nl behavior, nl affect  NEUROLOGY: AAOx3, non-focal, cranial nerves intact  SKIN: Normal color, No rashes or lesions  LINES:    Cardiovascular Diagnostic Testing:    ECG: Personally reviewed:    Echo: Personally reviewed:    Stress Testing:    Cath:    Imaging:    CXR: Personally reviewed    Labs: Personally reviewed                        9.4    8.43  )-----------( 331      ( 07 May 2021 07:17 )             30.2     05-07    141  |  102  |  14  ----------------------------<  97  3.6   |  25  |  0.99    Ca    8.6      07 May 2021 07:17  Phos  2.9     05-07  Mg     2.1     05-07    TPro  6.7  /  Alb  3.8  /  TBili  0.2  /  DBili  x   /  AST  21  /  ALT  15  /  AlkPhos  88  05-05    PT/INR - ( 06 May 2021 10:11 )   PT: 13.4 sec;   INR: 1.12 ratio           Serum Pro-Brain Natriuretic Peptide: 617 pg/mL (05-05 @ 23:01)

## 2021-05-07 NOTE — PROGRESS NOTE ADULT - PROBLEM SELECTOR PLAN 5
s/p mechanical fall, witnessed.   - non-displaced nasal fracture as above.  - rest of imaging with no other acute fracture/dislocation

## 2021-05-07 NOTE — CHART NOTE - NSCHARTNOTEFT_GEN_A_CORE
Sign out from Night shift on pt with new onset A. fib with RVR with HR 130s, s/p metoprolol 5 mg IV X1  pt seen and examined at bedside.  Alert and oriented X2, follow commands, NAD. Reports pain in legs   /72 -130s Temp 36.6 O2 sat 97%  -Will give Tylenol 650 mg PO X1 for pain  - s/p Metoprolol 5 mg IV X1  - transfer to tele  - Cardiology ( house ) called   - Lasix increased to 20 mg IV BID  - Plan discussed with hospitalist Dr. Litzy Mcghee NP-C  #[id number]

## 2021-05-08 LAB
ANION GAP SERPL CALC-SCNC: 14 MMOL/L — SIGNIFICANT CHANGE UP (ref 5–17)
BUN SERPL-MCNC: 18 MG/DL — SIGNIFICANT CHANGE UP (ref 7–23)
CALCIUM SERPL-MCNC: 8.6 MG/DL — SIGNIFICANT CHANGE UP (ref 8.4–10.5)
CHLORIDE SERPL-SCNC: 103 MMOL/L — SIGNIFICANT CHANGE UP (ref 96–108)
CO2 SERPL-SCNC: 23 MMOL/L — SIGNIFICANT CHANGE UP (ref 22–31)
CREAT SERPL-MCNC: 0.9 MG/DL — SIGNIFICANT CHANGE UP (ref 0.5–1.3)
FERRITIN SERPL-MCNC: 32 NG/ML — SIGNIFICANT CHANGE UP (ref 15–150)
FOLATE SERPL-MCNC: 8.1 NG/ML — SIGNIFICANT CHANGE UP
GLUCOSE SERPL-MCNC: 101 MG/DL — HIGH (ref 70–99)
HCT VFR BLD CALC: 30.3 % — LOW (ref 34.5–45)
HGB BLD-MCNC: 9.5 G/DL — LOW (ref 11.5–15.5)
IRON SATN MFR SERPL: 23 UG/DL — LOW (ref 30–160)
IRON SATN MFR SERPL: 7 % — LOW (ref 14–50)
MAGNESIUM SERPL-MCNC: 2.1 MG/DL — SIGNIFICANT CHANGE UP (ref 1.6–2.6)
MCHC RBC-ENTMCNC: 24.8 PG — LOW (ref 27–34)
MCHC RBC-ENTMCNC: 31.4 GM/DL — LOW (ref 32–36)
MCV RBC AUTO: 79.1 FL — LOW (ref 80–100)
NRBC # BLD: 0 /100 WBCS — SIGNIFICANT CHANGE UP (ref 0–0)
PHOSPHATE SERPL-MCNC: 2.8 MG/DL — SIGNIFICANT CHANGE UP (ref 2.5–4.5)
PLATELET # BLD AUTO: 349 K/UL — SIGNIFICANT CHANGE UP (ref 150–400)
POTASSIUM SERPL-MCNC: 3.7 MMOL/L — SIGNIFICANT CHANGE UP (ref 3.5–5.3)
POTASSIUM SERPL-SCNC: 3.7 MMOL/L — SIGNIFICANT CHANGE UP (ref 3.5–5.3)
RBC # BLD: 3.83 M/UL — SIGNIFICANT CHANGE UP (ref 3.8–5.2)
RBC # FLD: 16.2 % — HIGH (ref 10.3–14.5)
SODIUM SERPL-SCNC: 140 MMOL/L — SIGNIFICANT CHANGE UP (ref 135–145)
TIBC SERPL-MCNC: 338 UG/DL — SIGNIFICANT CHANGE UP (ref 220–430)
TRANSFERRIN SERPL-MCNC: 274 MG/DL — SIGNIFICANT CHANGE UP (ref 200–360)
UIBC SERPL-MCNC: 314 UG/DL — SIGNIFICANT CHANGE UP (ref 110–370)
VIT B12 SERPL-MCNC: 1105 PG/ML — SIGNIFICANT CHANGE UP (ref 232–1245)
WBC # BLD: 9.23 K/UL — SIGNIFICANT CHANGE UP (ref 3.8–10.5)
WBC # FLD AUTO: 9.23 K/UL — SIGNIFICANT CHANGE UP (ref 3.8–10.5)

## 2021-05-08 PROCEDURE — 70450 CT HEAD/BRAIN W/O DYE: CPT | Mod: 26

## 2021-05-08 PROCEDURE — 99232 SBSQ HOSP IP/OBS MODERATE 35: CPT | Mod: GC

## 2021-05-08 PROCEDURE — 99232 SBSQ HOSP IP/OBS MODERATE 35: CPT

## 2021-05-08 PROCEDURE — 99233 SBSQ HOSP IP/OBS HIGH 50: CPT

## 2021-05-08 RX ORDER — METOPROLOL TARTRATE 50 MG
2.5 TABLET ORAL ONCE
Refills: 0 | Status: COMPLETED | OUTPATIENT
Start: 2021-05-08 | End: 2021-05-08

## 2021-05-08 RX ORDER — FERROUS SULFATE 325(65) MG
325 TABLET ORAL
Refills: 0 | Status: DISCONTINUED | OUTPATIENT
Start: 2021-05-08 | End: 2021-05-12

## 2021-05-08 RX ORDER — POTASSIUM CHLORIDE 20 MEQ
20 PACKET (EA) ORAL ONCE
Refills: 0 | Status: COMPLETED | OUTPATIENT
Start: 2021-05-08 | End: 2021-05-08

## 2021-05-08 RX ADMIN — Medication 81 MILLIGRAM(S): at 12:00

## 2021-05-08 RX ADMIN — FAMOTIDINE 20 MILLIGRAM(S): 10 INJECTION INTRAVENOUS at 05:20

## 2021-05-08 RX ADMIN — Medication 20 MILLIGRAM(S): at 18:29

## 2021-05-08 RX ADMIN — PREGABALIN 1000 MICROGRAM(S): 225 CAPSULE ORAL at 12:00

## 2021-05-08 RX ADMIN — Medication 20 MILLIGRAM(S): at 21:32

## 2021-05-08 RX ADMIN — FAMOTIDINE 20 MILLIGRAM(S): 10 INJECTION INTRAVENOUS at 18:30

## 2021-05-08 RX ADMIN — Medication 25 MILLIGRAM(S): at 18:30

## 2021-05-08 RX ADMIN — ENOXAPARIN SODIUM 40 MILLIGRAM(S): 100 INJECTION SUBCUTANEOUS at 12:00

## 2021-05-08 RX ADMIN — Medication 2.5 MILLIGRAM(S): at 23:59

## 2021-05-08 RX ADMIN — Medication 20 MILLIEQUIVALENT(S): at 11:54

## 2021-05-08 RX ADMIN — Medication 20 MILLIGRAM(S): at 05:19

## 2021-05-08 RX ADMIN — SERTRALINE 100 MILLIGRAM(S): 25 TABLET, FILM COATED ORAL at 12:00

## 2021-05-08 RX ADMIN — EZETIMIBE 10 MILLIGRAM(S): 10 TABLET ORAL at 21:32

## 2021-05-08 RX ADMIN — Medication 25 MILLIGRAM(S): at 05:20

## 2021-05-08 RX ADMIN — Medication 1000 UNIT(S): at 12:00

## 2021-05-08 NOTE — DIETITIAN INITIAL EVALUATION ADULT. - ORAL INTAKE PTA/DIET HISTORY
Pt confused; declined  phone but would go back and forth between English and Irish. Unable to reach sister Ghada via telephone at this time. Pt reports good appetite/PO intake PTA. States her sister or HHA help with cooking. Reports was never told she has heart problems or to not use salt. NKFA. Per chart PTA micronutrient supplementation includes cyanocobalamin and vitamin D3.

## 2021-05-08 NOTE — DIETITIAN INITIAL EVALUATION ADULT. - OTHER INFO
Pt unable to report UBW. Denies recent weight changes. Dosing weight 81.6 kg, question accuracy as today's standing weight is 105.7, BMI 36 kg/m2, and also of note pt is noted with 3+ edema.     Denies chewing/swallowing difficulty. Noted on soft diet; per staff pt is tolerating texture. No GI distress reported. Last BM 5/5.    Inappropriate to provide nutrition education at this time as pt is confused.

## 2021-05-08 NOTE — PROGRESS NOTE ADULT - PROBLEM SELECTOR PLAN 9
DVT ppx: Lovenox    Discussed with patient's PCP Dr. Morenita Carroll on 5/6/21.    Therese Hayward DO  Pager #: [phone]  Available on Teams francisca    Plan discussed with CELINE ABBOTT upon discharge

## 2021-05-08 NOTE — PHYSICAL THERAPY INITIAL EVALUATION ADULT - ADDITIONAL COMMENTS
Pt poor historian, states she lives in a house w/ sister, "a couple" steps to enter, 1 flight inside. PTA pt states she was able to ambulate w/ straight cane.

## 2021-05-08 NOTE — DIETITIAN INITIAL EVALUATION ADULT. - PERTINENT LABORATORY DATA
Labs: 05-08 @ 09:46: Sodium --, Potassium --, Calcium --, Magnesium --, Phosphorus --, BUN --, Creatinine --, Glucose --, Alk Phos --, ALT/SGPT --, AST/SGOT --, Albumin --, Prealbumin --, Total Bilirubin --, Hemoglobin --, Hematocrit --, Ferritin 32, C-Reactive Protein --, Creatine Kinase <<27>  05-08 @ 07:04: Sodium 140, Potassium 3.7, Calcium 8.6, Magnesium 2.1, Phosphorus 2.8, BUN 18, Creatinine 0.90, Glucose 101<H>, Alk Phos --, ALT/SGPT --, AST/SGOT --, Albumin --, Prealbumin --, Total Bilirubin --, Hemoglobin 9.5<L>, Hematocrit 30.3<L>, Ferritin --, C-Reactive Protein --, Creatine Kinase <<27>

## 2021-05-08 NOTE — DIETITIAN INITIAL EVALUATION ADULT. - ADD RECOMMEND
Review CHF Nutrition education as appropriate with pt/family. Monitor tolerance to diet prescription, nutritional intake, GI function, weight trends, labs and skin integrity.

## 2021-05-08 NOTE — DIETITIAN INITIAL EVALUATION ADULT. - PERTINENT MEDS FT
MEDICATIONS  (STANDING):  aspirin enteric coated 81 milliGRAM(s) Oral daily  cholecalciferol 1000 Unit(s) Oral daily  cyanocobalamin 1000 MICROGram(s) Oral daily  enoxaparin Injectable 40 milliGRAM(s) SubCutaneous daily  ezetimibe 10 milliGRAM(s) Oral at bedtime  famotidine    Tablet 20 milliGRAM(s) Oral two times a day  furosemide   Injectable 20 milliGRAM(s) IV Push two times a day  lovastatin 20 milliGRAM(s) Oral at bedtime  metoprolol tartrate 25 milliGRAM(s) Oral every 12 hours  sertraline 100 milliGRAM(s) Oral daily    MEDICATIONS  (PRN):  acetaminophen   Tablet .. 650 milliGRAM(s) Oral every 6 hours PRN Temp greater or equal to 38C (100.4F), Mild Pain (1 - 3)  QUEtiapine 12.5 milliGRAM(s) Oral at bedtime PRN agitation or insomnia

## 2021-05-08 NOTE — PHYSICAL THERAPY INITIAL EVALUATION ADULT - PERTINENT HX OF CURRENT PROBLEM, REHAB EVAL
75 y.o. F PMH mod cognitive impairment, essential HTN, hyperlipidemia, obesity,  apparently being treated by her PCP for presumed B/L cellulitis with oral antibiotics, p/w mechanical fall upon her face, with a nasal fracture & RIGHT scalp contusion. Sister reports patient w/ poor functional capacity & frequent falls with a reported nondiagnostic workup by a neurologist.

## 2021-05-08 NOTE — PROGRESS NOTE ADULT - SUBJECTIVE AND OBJECTIVE BOX
[facility] Division of Hospital Medicine  Therese DO Mainor  Pager (MILLER-F, [id number]): [phone]  Other Times:  [phone]    Patient is a 75y old  Female who presents with a chief complaint of S/P presumed mechanical fall (08 May 2021 13:43)      SUBJECTIVE / OVERNIGHT EVENTS: none. This morning, patient very lethargic/sleepy. Nurse at bedside said that earlier she had worked with PT and been quite talkative. Patient had no complaints.  ADDITIONAL REVIEW OF SYSTEMS: negative    MEDICATIONS  (STANDING):  aspirin enteric coated 81 milliGRAM(s) Oral daily  cholecalciferol 1000 Unit(s) Oral daily  cyanocobalamin 1000 MICROGram(s) Oral daily  enoxaparin Injectable 40 milliGRAM(s) SubCutaneous daily  ezetimibe 10 milliGRAM(s) Oral at bedtime  famotidine    Tablet 20 milliGRAM(s) Oral two times a day  furosemide   Injectable 20 milliGRAM(s) IV Push two times a day  lovastatin 20 milliGRAM(s) Oral at bedtime  metoprolol tartrate 25 milliGRAM(s) Oral every 12 hours  sertraline 100 milliGRAM(s) Oral daily    MEDICATIONS  (PRN):  acetaminophen   Tablet .. 650 milliGRAM(s) Oral every 6 hours PRN Temp greater or equal to 38C (100.4F), Mild Pain (1 - 3)  QUEtiapine 12.5 milliGRAM(s) Oral at bedtime PRN agitation or insomnia      CAPILLARY BLOOD GLUCOSE        I&O's Summary    07 May 2021 07:01  -  08 May 2021 07:00  --------------------------------------------------------  IN: 0 mL / OUT: 1450 mL / NET: -1450 mL    08 May 2021 07:01  -  08 May 2021 16:11  --------------------------------------------------------  IN: 480 mL / OUT: 1 mL / NET: 479 mL        PHYSICAL EXAM:  Vital Signs Last 24 Hrs  T(C): 36.7 (08 May 2021 13:35), Max: 37.1 (08 May 2021 00:42)  T(F): 98.1 (08 May 2021 13:35), Max: 98.7 (08 May 2021 00:42)  HR: 89 (08 May 2021 13:35) (85 - 96)  BP: 134/74 (08 May 2021 13:35) (101/60 - 160/78)  BP(mean): --  RR: 17 (08 May 2021 13:35) (16 - 18)  SpO2: 97% (08 May 2021 13:35) (91% - 97%)    CONSTITUTIONAL: NAD, well-developed, well-groomed  EYES: PERRLA; conjunctiva and sclera clear  ENMT: Moist oral mucosa, no pharyngeal injection or exudates  NECK: Supple, no palpable masses; no thyromegaly  RESPIRATORY: Normal respiratory effort; lungs are clear to auscultation bilaterally  CARDIOVASCULAR: Regular rate and rhythm, normal S1 and S2, no murmur/rub/gallop; minimal lower extremity edema; Peripheral pulses are 2+ bilaterally  ABDOMEN: Nontender to palpation, normoactive bowel sounds, no rebound/guarding; No hepatosplenomegaly  MUSCULOSKELETAL: no clubbing or cyanosis of digits; no joint swelling or tenderness to palpation  NEUROLOGY: lethargic, moves all extremities  SKIN: No rashes; no palpable lesions    LABS:                        9.5    9.23  )-----------( 349      ( 08 May 2021 07:04 )             30.3     05-08    140  |  103  |  18  ----------------------------<  101<H>  3.7   |  23  |  0.90    Ca    8.6      08 May 2021 07:04  Phos  2.8     05-08  Mg     2.1     05-08                Culture - Blood (collected 06 May 2021 04:15)  Source: .Blood Blood-Venous  Preliminary Report (07 May 2021 05:01):    No growth to date.    Culture - Blood (collected 06 May 2021 04:15)  Source: .Blood Blood-Venous  Preliminary Report (07 May 2021 05:01):    No growth to date.

## 2021-05-08 NOTE — DIETITIAN INITIAL EVALUATION ADULT. - REASON FOR ADMISSION
76 yo female with PMH of dementia, HTN, HLD, morbid obesity who presents from her PCP office for witnessed mechanical fall found to have non-displaced nasal fracture admitted for lower extremity edema and pulmonary edema concerning for CHF with course c/b new onset atrial fibrillation with RVR.

## 2021-05-08 NOTE — PROGRESS NOTE ADULT - PROBLEM SELECTOR PLAN 1
new onset afib with RVR this morning requiring IV metoprolol  - CHADSVASc 4  - would not be ideal candidate for a/c at this time as per previous doctor's conversation with her sister Ghada, she suffers from frequent falls at home and is coming to us here with again fall (this fall was mechanical and witnessed by her sister, patient was rushing into PCP office and tripped)  - discussed re: a/c with sister Ghada who is in agreement that patient has falls almost daily (mostly when she tries to get out of bed) and the risks of bleeding outweighs the benefits of at this time.  - change home carvedilol 3.125mg q12h to metoprolol tartrate 25mg PO q12h for goal HR<110 as BP has been soft  - will increase frequency or dose if needed for rate control  - TTE pending, f/u results  - Cardiology consult recs appreciated

## 2021-05-08 NOTE — DIETITIAN INITIAL EVALUATION ADULT. - PROBLEM SELECTOR PLAN 6
Transitions of Care Status:  1.  Name of PCP:     Morenita Link DO (PCP-- [facility] )  [phone]  2.  PCP Contacted on Admission: [ ] Y    [x ] N    3.  PCP contacted at Discharge: [ ] Y    [ ] N    [ ] N/A  4.  Post-Discharge Appointment Date and Location:  5.  Summary of Handoff given to PCP:

## 2021-05-08 NOTE — CHART NOTE - NSCHARTNOTEFT_GEN_A_CORE
CC: conversion from SR to AFib on telemetry      HPI:  Called by RN to evaluate patient for  Patient seen and assessed at bedside,   Patient denied headache, dizziness, CP, SOB, abdominal  pain, N/V/D, numbness/tingling, extremity weakness, dysuria.         ROS:  CONSTITUTIONAL:  No fever, chills, rigors  CARDIOVASCULAR:  No chest pain or palpitations  RESPIRATORY:   No SOB, cough, wheezing  GASTROINTESTINAL:  No abd pain, N/V/D  EXTREMITIES:  No swelling or joint pain  GENITOURINARY:  No burning on urination, increased frequency or urgency.   NEUROLOGIC:  No HA, visual disturbances  SKIN: No rashes        PAST MEDICAL & SURGICAL HISTORY:  Essential (primary) hypertension  Hyperlipidemia, unspecified hyperlipidemia type  Gastroesophageal reflux disease without esophagitis  Other type of osteoarthritis, unspecified site  Dementia with behavioral disturbance, unspecified dementia type  Hypertension, unspecified type  Hyperlipidemia, unspecified hyperlipidemia type  Obesity, unspecified classification, unspecified obesity type, unspecified whether serious comorbidity present  History of knee replacement, total, bilateral  Status post total bilateral knee replacement            Vital Signs Last 24 Hrs  T(C): 36.9 (08 May 2021 23:17), Max: 37.1 (08 May 2021 00:42)  T(F): 98.5 (08 May 2021 23:17), Max: 98.7 (08 May 2021 00:42)  HR: 120 (08 May 2021 23:17) (70 - 120)  BP: 127/67 (08 May 2021 23:17) (111/73 - 160/78)  RR: 18 (08 May 2021 23:17) (16 - 18)  SpO2: 95% (08 May 2021 23:17) (94% - 97%)      Physical Exam:  General: WN/WD NAD, AOx3, nontoxic appearing  Head:  NC/AT  CV: RRR, S1S2   Respiratory: CTA B/L, nonlabored  Abdominal: (+) bowel sounds x4. Soft, NT, ND, no guarding or rebound tenderness  Genitourinary: ? Honeycutt   MSK: No BLLE edema, + peripheral pulses, FROM all 4 extremity  Skin: (+) warm, dry   Psych: Appropriate affect       Labs:                        9.5    9.23  )-----------( 349      ( 08 May 2021 07:04 )             30.3     05-08    140  |  103  |  18  ----------------------------<  101<H>  3.7   |  23  |  0.90    Ca    8.6      08 May 2021 07:04  Phos  2.8     05-08  Mg     2.1     05-08                Radiology:  < from: CT Head No Cont (05.08.21 @ 19:05) >  IMPRESSION: No acute intracranial hemorrhage, mass effect, or shift of the midline structures.  Slight interval decrease in size of a moderate-sized right frontal scalp hematoma.  < end of copied text >    < from: [facility] Duplex Lower Ext Vein Scan, Bilat (05.06.21 @ 12:50) >  No evidence of deep venous thrombosis in either lower extremity.  < end of copied text >    < from: CT Lumbar Spine Reform No Cont (05.05.21 @ 18:52) >  Severe multilevel degenerative changes as described above. Mild L2-3 central stenosis, moderate L3-4 central stenosis and severe L4-5 and L5-S1 central stenosis on a degenerative basis due to disc bulge, facet osteophytic hypertrophy and redundancy of ligamentum flavum.  No acute lumbar vertebral fractures.  < end of copied text >    < from: CT Abdomen and Pelvis w/ IV Cont (05.05.21 @ 18:41) >  No CT evidence of acute visceral or osseous injury in the abdomen and pelvis.  < end of copied text >    < from: CT Head No Cont (05.05.21 @ 18:40) >  CT HEAD: No acute intracranial hemorrhage, masseffect, or CT evidence of acute territorial infarct.  CT CERVICAL SPINE: No acute displaced fracture or traumatic malalignment.  CT MAXILLOFACIAL: Acute nondisplaced fracture along the left nasal bone with left to rightward deviation of nasal septum and questionable area of additional fracture within the nasal septum. Right frontal scalp swelling.  < end of copied text >    < from: Xray Chest 1 View- PORTABLE-Routine (Xray Chest 1 View- PORTABLE-Routine .) (05.05.21 @ 17:30) >  Diffuse bilateral reticular opacities, likely pulmonary vascular congestion.  < end of copied text >        Assessment & Plan:  76 yo female with PMH of dementia, HTN, HLD, morbid obesity who presents from her PCP office for witnessed mechanical fall found to have non-displaced nasal fracture admitted for lower extremity edema and pulmonary edema concerning for CHF with course c/b new onset atrial fibrillation with RVR.        #Afib RVR- r/o electrolyte abnormalities  -  -  -Will continue to closely monitor patient/vitals  -Primary Team to follow up in AM, attending to follow       Brandon Dickinson PA-C  Dept of Medicine  [id number] CC: conversion from SR to AFib on telemetry      HPI:  Called by RN to evaluate patient for  Telemetry reviewed, noted that patient converted from SR to AFib at approximately 22:15 tonight.   Patient seen and assessed at bedside,   Patient denied headache, dizziness, CP, SOB, abdominal  pain, N/V/D, numbness/tingling, extremity weakness, dysuria.         ROS:  CONSTITUTIONAL:  No fever, chills, rigors  CARDIOVASCULAR:  No chest pain or palpitations  RESPIRATORY:   No SOB, cough, wheezing  GASTROINTESTINAL:  No abd pain, N/V/D  EXTREMITIES:  No swelling or joint pain  GENITOURINARY:  No burning on urination, increased frequency or urgency.   NEUROLOGIC:  No HA, visual disturbances  SKIN: No rashes        PAST MEDICAL & SURGICAL HISTORY:  Essential (primary) hypertension  Hyperlipidemia, unspecified hyperlipidemia type  Gastroesophageal reflux disease without esophagitis  Other type of osteoarthritis, unspecified site  Dementia with behavioral disturbance, unspecified dementia type  Hypertension, unspecified type  Hyperlipidemia, unspecified hyperlipidemia type  Obesity, unspecified classification, unspecified obesity type, unspecified whether serious comorbidity present  History of knee replacement, total, bilateral  Status post total bilateral knee replacement            Vital Signs Last 24 Hrs  T(C): 36.9 (08 May 2021 23:17), Max: 37.1 (08 May 2021 00:42)  T(F): 98.5 (08 May 2021 23:17), Max: 98.7 (08 May 2021 00:42)  HR: 120 (08 May 2021 23:17) (70 - 120)  BP: 127/67 (08 May 2021 23:17) (111/73 - 160/78)  RR: 18 (08 May 2021 23:17) (16 - 18)  SpO2: 95% (08 May 2021 23:17) (94% - 97%)      Physical Exam:  General: WN/WD NAD, AOx3, nontoxic appearing  Head:  NC/AT  CV: RRR, S1S2   Respiratory: CTA B/L, nonlabored  Abdominal: (+) bowel sounds x4. Soft, NT, ND, no guarding or rebound tenderness  Genitourinary: ? Honeycutt   MSK: No BLLE edema, + peripheral pulses, FROM all 4 extremity  Skin: (+) warm, dry   Psych: Appropriate affect       Labs:                        9.5    9.23  )-----------( 349      ( 08 May 2021 07:04 )             30.3     05-08    140  |  103  |  18  ----------------------------<  101<H>  3.7   |  23  |  0.90    Ca    8.6      08 May 2021 07:04  Phos  2.8     05-08  Mg     2.1     05-08                Radiology:  < from: CT Head No Cont (05.08.21 @ 19:05) >  IMPRESSION: No acute intracranial hemorrhage, mass effect, or shift of the midline structures.  Slight interval decrease in size of a moderate-sized right frontal scalp hematoma.  < end of copied text >    < from: VA Duplex Lower Ext Vein Scan, Bilat (05.06.21 @ 12:50) >  No evidence of deep venous thrombosis in either lower extremity.  < end of copied text >    < from: CT Lumbar Spine Reform No Cont (05.05.21 @ 18:52) >  Severe multilevel degenerative changes as described above. Mild L2-3 central stenosis, moderate L3-4 central stenosis and severe L4-5 and L5-S1 central stenosis on a degenerative basis due to disc bulge, facet osteophytic hypertrophy and redundancy of ligamentum flavum.  No acute lumbar vertebral fractures.  < end of copied text >    < from: CT Abdomen and Pelvis w/ IV Cont (05.05.21 @ 18:41) >  No CT evidence of acute visceral or osseous injury in the abdomen and pelvis.  < end of copied text >    < from: CT Head No Cont (05.05.21 @ 18:40) >  CT HEAD: No acute intracranial hemorrhage, masseffect, or CT evidence of acute territorial infarct.  CT CERVICAL SPINE: No acute displaced fracture or traumatic malalignment.  CT MAXILLOFACIAL: Acute nondisplaced fracture along the left nasal bone with left to rightward deviation of nasal septum and questionable area of additional fracture within the nasal septum. Right frontal scalp swelling.  < end of copied text >    < from: Xray Chest 1 View- PORTABLE-Routine (Xray Chest 1 View- PORTABLE-Routine .) (05.05.21 @ 17:30) >  Diffuse bilateral reticular opacities, likely pulmonary vascular congestion.  < end of copied text >        Assessment & Plan:  74 yo female with PMH of dementia, HTN, HLD, morbid obesity who presents from her PCP office for witnessed mechanical fall found to have non-displaced nasal fracture admitted for lower extremity edema and pulmonary edema concerning for CHF with course c/b new onset atrial fibrillation with RVR.        #Afib RVR- r/o electrolyte abnormalities  -  -  -Will continue to closely monitor patient/vitals  -Primary Team to follow up in AM, attending to follow       Brandon Dickinson PA-C  Dept of Medicine  [id number] CC: conversion from SR to AFib on telemetry      HPI:  Called by RN to evaluate patient for conversion from sinus rhythm to Afib on telemetry with HR up to 130s. Telemetry reviewed, noted that patient converted from SR to AFib at approximately 22:15 tonight.   Patient seen and assessed at bedside, NAD, alert and awake. She denied headache, dizziness, CP, palpitations, SOB, abdominal pain, N/V/D, extremity numbness/tingling. Patient stating her "heart is feeling very relaxed" on exam. Of note, patient was diagnosed with new-onset atrial fibrillation this admission on 2021.        ROS:  CONSTITUTIONAL:  No fever, chills, rigors  CARDIOVASCULAR:  No chest pain or palpitations  RESPIRATORY:   No SOB, cough, wheezing  GASTROINTESTINAL:  No abd pain, N/V/D  NEUROLOGIC:  No HA, visual disturbances  SKIN: No rashes        PAST MEDICAL & SURGICAL HISTORY:  Essential (primary) hypertension  Hyperlipidemia, unspecified hyperlipidemia type  Gastroesophageal reflux disease without esophagitis  Other type of osteoarthritis, unspecified site  Dementia with behavioral disturbance, unspecified dementia type  Hypertension, unspecified type  Hyperlipidemia, unspecified hyperlipidemia type  Obesity, unspecified classification, unspecified obesity type, unspecified whether serious comorbidity present  History of knee replacement, total, bilateral  Status post total bilateral knee replacement            Vital Signs Last 24 Hrs  T(C): 36.9 (08 May 2021 23:17), Max: 37.1 (08 May 2021 00:42)  T(F): 98.5 (08 May 2021 23:17), Max: 98.7 (08 May 2021 00:42)  HR: 120 (08 May 2021 23:17) (70 - 120)  BP: 127/67 (08 May 2021 23:17) (111/73 - 160/78)  RR: 18 (08 May 2021 23:17) (16 - 18)  SpO2: 95% (08 May 2021 23:17) (94% - 97%)      Physical Exam:  General: Obese female laying in bed, NAD, AOx1 (patient oriented to self and able to provide , 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 Large, soft abdomen, NT, no guarding or rebound tenderness  MSK: (+) trace BLLE edema, + peripheral pulses, FROM all 4 extremity  Skin: (+) warm, dry         Labs:                        9.5    9.23  )-----------( 349      ( 08 May 2021 07:04 )             30.3     05-08    140  |  103  |  18  ----------------------------<  101<H>  3.7   |  23  |  0.90    Ca    8.6      08 May 2021 07:04  Phos  2.8     05-08  Mg     2.1     05-08                Radiology:  < from: CT Head No Cont (21 @ 19:05) >  IMPRESSION: No acute intracranial hemorrhage, mass effect, or shift of the midline structures.  Slight interval decrease in size of a moderate-sized right frontal scalp hematoma.  < end of copied text >    < from: VA Duplex Lower Ext Vein Scan, Bilat (21 @ 12:50) >  No evidence of deep venous thrombosis in either lower extremity.  < end of copied text >    < from: CT Lumbar Spine Reform No Cont (21 @ 18:52) >  Severe multilevel degenerative changes as described above. Mild L2-3 central stenosis, moderate L3-4 central stenosis and severe L4-5 and L5-S1 central stenosis on a degenerative basis due to disc bulge, facet osteophytic hypertrophy and redundancy of ligamentum flavum.  No acute lumbar vertebral fractures.  < end of copied text >    < from: CT Abdomen and Pelvis w/ IV Cont (21 @ 18:41) >  No CT evidence of acute visceral or osseous injury in the abdomen and pelvis.  < end of copied text >    < from: CT Head No Cont (21 @ 18:40) >  CT HEAD: No acute intracranial hemorrhage, mass effect, or CT evidence of acute territorial infarct.  CT CERVICAL SPINE: No acute displaced fracture or traumatic malalignment.  CT MAXILLOFACIAL: Acute nondisplaced fracture along the left nasal bone with left to rightward deviation of nasal septum and questionable area of additional fracture within the nasal septum. Right frontal scalp swelling.  < end of copied text >    < from: Xray Chest 1 View- PORTABLE-Routine (Xray Chest 1 View- PORTABLE-Routine .) (21 @ 17:30) >  Diffuse bilateral reticular opacities, likely pulmonary vascular congestion.  < end of copied text >        Assessment & Plan:  74 yo female with PMH of dementia, HTN, HLD, morbid obesity who presents from her PCP office for witnessed mechanical fall found to have non-displaced nasal fracture admitted for lower extremity edema and pulmonary edema concerning for CHF with course c/b new onset atrial fibrillation with RVR.  Patient now presenting with conversion from SR to AFib at approximately 22:15 this evening; HR fluctuating 120-130s on telemetry. Patient is asymptomatic and without complaints.      #Afib RVR- r/o electrolyte abnormalities  -Vital signs hemodynamically stable; patient is afebrile and asymptomatic  -Telemetry currently AFib -130s; continue to monitor on telemetry  -Lopressor 2.5mg IVP x1 ordered; plan to administer repeat dose if HR remains uncontrolled  -STAT BMP and Mg ordered  -Keep Mg >2.0, K >4.0  -Close monitoring of electrolytes as patient is receiving Lasix 20mg IVP BID  -Continue with Lopressor 25mg PO BID; consider up-titration if HR remains uncontrolled  -CHADSVASc 4; however patient is not a candidate for AC given frequent falls  -TTE pending  -Follow up cardiology recommendations  -Will continue to closely monitor patient/vitals  -Primary Team to follow up in AM, attending to follow       Brandon Dickinson PA-C  Dept of Medicine  [id number] CC: conversion from SR to AFib on telemetry      HPI:  Called by RN to evaluate patient for conversion from sinus rhythm to Afib on telemetry with HR up to 130s. Telemetry reviewed, noted that patient converted from SR to AFib at approximately 22:15 tonight.   Patient seen and assessed at bedside, NAD, alert and awake. She denied headache, dizziness, CP, palpitations, SOB, abdominal pain, N/V/D, extremity numbness/tingling. Patient stating her "heart is feeling very relaxed" on exam. Of note, patient was diagnosed with new-onset atrial fibrillation this admission on 2021. Last dose of Lopressor 25mg PO administered at 18:30.         ROS:  CONSTITUTIONAL:  No fever, chills, rigors  CARDIOVASCULAR:  No chest pain or palpitations  RESPIRATORY:   No SOB, cough, wheezing  GASTROINTESTINAL:  No abd pain, N/V/D  NEUROLOGIC:  No HA, visual disturbances  SKIN: No rashes        PAST MEDICAL & SURGICAL HISTORY:  Essential (primary) hypertension  Hyperlipidemia, unspecified hyperlipidemia type  Gastroesophageal reflux disease without esophagitis  Other type of osteoarthritis, unspecified site  Dementia with behavioral disturbance, unspecified dementia type  Hypertension, unspecified type  Hyperlipidemia, unspecified hyperlipidemia type  Obesity, unspecified classification, unspecified obesity type, unspecified whether serious comorbidity present  History of knee replacement, total, bilateral  Status post total bilateral knee replacement            Vital Signs Last 24 Hrs  T(C): 36.9 (08 May 2021 23:17), Max: 37.1 (08 May 2021 00:42)  T(F): 98.5 (08 May 2021 23:17), Max: 98.7 (08 May 2021 00:42)  HR: 120 (08 May 2021 23:17) (70 - 120)  BP: 127/67 (08 May 2021 23:17) (111/73 - 160/78)  RR: 18 (08 May 2021 23:17) (16 - 18)  SpO2: 95% (08 May 2021 23:17) (94% - 97%)      Physical Exam:  General: Obese female laying in bed, NAD, AOx1 (patient oriented to self and able to provide , 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 Large, soft abdomen, NT, no guarding or rebound tenderness  MSK: (+) trace BLLE edema, + peripheral pulses, FROM all 4 extremity  Skin: (+) warm, dry         Labs:                        9.5    9.23  )-----------( 349      ( 08 May 2021 07:04 )             30.3     05-08    140  |  103  |  18  ----------------------------<  101<H>  3.7   |  23  |  0.90    Ca    8.6      08 May 2021 07:04  Phos  2.8       Mg     2.1                     Radiology:  < from: CT Head No Cont (21 @ 19:05) >  IMPRESSION: No acute intracranial hemorrhage, mass effect, or shift of the midline structures.  Slight interval decrease in size of a moderate-sized right frontal scalp hematoma.  < end of copied text >    < from: VA Duplex Lower Ext Vein Scan, Bilat (21 @ 12:50) >  No evidence of deep venous thrombosis in either lower extremity.  < end of copied text >    < from: CT Lumbar Spine Reform No Cont (21 @ 18:52) >  Severe multilevel degenerative changes as described above. Mild L2-3 central stenosis, moderate L3-4 central stenosis and severe L4-5 and L5-S1 central stenosis on a degenerative basis due to disc bulge, facet osteophytic hypertrophy and redundancy of ligamentum flavum.  No acute lumbar vertebral fractures.  < end of copied text >    < from: CT Abdomen and Pelvis w/ IV Cont (21 @ 18:41) >  No CT evidence of acute visceral or osseous injury in the abdomen and pelvis.  < end of copied text >    < from: CT Head No Cont (21 @ 18:40) >  CT HEAD: No acute intracranial hemorrhage, mass effect, or CT evidence of acute territorial infarct.  CT CERVICAL SPINE: No acute displaced fracture or traumatic malalignment.  CT MAXILLOFACIAL: Acute nondisplaced fracture along the left nasal bone with left to rightward deviation of nasal septum and questionable area of additional fracture within the nasal septum. Right frontal scalp swelling.  < end of copied text >    < from: Xray Chest 1 View- PORTABLE-Routine (Xray Chest 1 View- PORTABLE-Routine .) (21 @ 17:30) >  Diffuse bilateral reticular opacities, likely pulmonary vascular congestion.  < end of copied text >        Assessment & Plan:  74 yo female with PMH of dementia, HTN, HLD, morbid obesity who presents from her PCP office for witnessed mechanical fall found to have non-displaced nasal fracture admitted for lower extremity edema and pulmonary edema concerning for CHF with course c/b new onset atrial fibrillation with RVR.  Patient now presenting with conversion from SR to AFib at approximately 22:15 this evening; HR fluctuating 120-130s on telemetry. Patient is asymptomatic and without complaints.      #Afib RVR- r/o electrolyte abnormalities  -Vital signs hemodynamically stable; patient is afebrile and asymptomatic  -Telemetry currently AFib -130s; continue to monitor on telemetry  -Lopressor 2.5mg IVP x1 ordered; plan to administer repeat dose if HR remains uncontrolled  -STAT BMP and Mg ordered  -Keep Mg >2.0, K >4.0  -Close monitoring of electrolytes as patient is receiving Lasix 20mg IVP BID  -Continue with Lopressor 25mg PO BID; consider up-titration if HR remains uncontrolled  -CHADSVASc 4; however patient is not a candidate for AC given frequent falls  -TTE pending  -Follow up cardiology recommendations  -Will continue to closely monitor patient/vitals  -Primary Team to follow up in AM, attending to follow       Brandon Dickinson PA-C  Dept of Medicine  [id number]

## 2021-05-08 NOTE — PROGRESS NOTE ADULT - SUBJECTIVE AND OBJECTIVE BOX
Daily In-House Cardiology Progress Note  --------------------------------------------------    24-Hour Events/Subjective:      -No acute complaints.    Telemetry:  NSR    ROS:   Constitutional: No Fatigue, weakness, fever, chills  HEENT: No sore throat, dryness, hoarseness, congestion  Cardiovascular: No chest pain, SOB, palpitations, PERALTA, lightheadedness, dizziness  Respiratory: No wheezing, cough, phlegm  GI: No nausea, vomiting, diarrhea, poor PO tolerance, dyspepsia, dysphagia  Musculoskeletal: No myalgias, arthralgias, joint swelling, back pain  Extremities: No swelling, coldness  Skin: No rashes, lesions, pruritis   Neuro: No weakness, confusion, blurry vision  Psych: No anxiety, depression    Current Meds:  acetaminophen   Tablet .. 650 milliGRAM(s) Oral every 6 hours PRN  aspirin enteric coated 81 milliGRAM(s) Oral daily  cholecalciferol 1000 Unit(s) Oral daily  cyanocobalamin 1000 MICROGram(s) Oral daily  enoxaparin Injectable 40 milliGRAM(s) SubCutaneous daily  ezetimibe 10 milliGRAM(s) Oral at bedtime  famotidine    Tablet 20 milliGRAM(s) Oral two times a day  furosemide   Injectable 20 milliGRAM(s) IV Push two times a day  lovastatin 20 milliGRAM(s) Oral at bedtime  metoprolol tartrate 25 milliGRAM(s) Oral every 12 hours  QUEtiapine 12.5 milliGRAM(s) Oral at bedtime PRN  sertraline 100 milliGRAM(s) Oral daily    Vitals:  T(F): 98.1 (05-08), Max: 98.7 (05-08)  HR: 89 (05-08) (85 - 96)  BP: 134/74 (05-08) (101/60 - 160/78)  RR: 17 (05-08)  SpO2: 97% (05-08)  I&O's Summary    07 May 2021 07:01  -  08 May 2021 07:00  --------------------------------------------------------  IN: 0 mL / OUT: 1450 mL / NET: -1450 mL    08 May 2021 07:01  -  08 May 2021 13:44  --------------------------------------------------------  IN: 360 mL / OUT: 1 mL / NET: 359 mL    Physical Exam:  GENERAL: No acute distress, well-developed  HEAD:  periorbital ecchymosis  ENT: EOMI, PERRLA, conjunctiva and sclera clear, Neck supple, No JVD, moist mucosa  CHEST/LUNG: Clear to auscultation bilaterally; No wheeze, equal breath sounds bilaterally   HEART: RRR, 2/6 systolic murmur heard best at the RUSB and LUSB. Non radiating  ABDOMEN: Soft, Nontender, Nondistended; Bowel sounds present  EXTREMITIES: trace edema in b/l LE  PSYCH: Nl behavior, nl affect  NEUROLOGY: AAOx3, non-focal, cranial nerves intact  SKIN: Normal color, No rashes or lesions                        9.5    9.23  )-----------( 349      ( 08 May 2021 07:04 )             30.3     05-08    140  |  103  |  18  ----------------------------<  101<H>  3.7   |  23  |  0.90    Ca    8.6      08 May 2021 07:04  Phos  2.8     05-08  Mg     2.1     05-08    CARDIAC MARKERS ( 06 May 2021 01:13 )  15 ng/L / x     / x     / x     / x     / x      CARDIAC MARKERS ( 05 May 2021 23:01 )  14 ng/L / x     / x     / x     / x     / x        Serum Pro-Brain Natriuretic Peptide: 617 pg/mL (05-05 @ 23:01)

## 2021-05-08 NOTE — PROVIDER CONTACT NOTE (OTHER) - ACTION/TREATMENT ORDERED:
Medicine PA to assess patient bedside
As per YOANA Bright, order for EKG pending will assess patient

## 2021-05-09 LAB
ANION GAP SERPL CALC-SCNC: 13 MMOL/L — SIGNIFICANT CHANGE UP (ref 5–17)
ANION GAP SERPL CALC-SCNC: 14 MMOL/L — SIGNIFICANT CHANGE UP (ref 5–17)
BUN SERPL-MCNC: 17 MG/DL — SIGNIFICANT CHANGE UP (ref 7–23)
BUN SERPL-MCNC: 19 MG/DL — SIGNIFICANT CHANGE UP (ref 7–23)
CALCIUM SERPL-MCNC: 8.7 MG/DL — SIGNIFICANT CHANGE UP (ref 8.4–10.5)
CALCIUM SERPL-MCNC: 9.3 MG/DL — SIGNIFICANT CHANGE UP (ref 8.4–10.5)
CHLORIDE SERPL-SCNC: 102 MMOL/L — SIGNIFICANT CHANGE UP (ref 96–108)
CHLORIDE SERPL-SCNC: 103 MMOL/L — SIGNIFICANT CHANGE UP (ref 96–108)
CO2 SERPL-SCNC: 25 MMOL/L — SIGNIFICANT CHANGE UP (ref 22–31)
CO2 SERPL-SCNC: 26 MMOL/L — SIGNIFICANT CHANGE UP (ref 22–31)
CREAT SERPL-MCNC: 0.82 MG/DL — SIGNIFICANT CHANGE UP (ref 0.5–1.3)
CREAT SERPL-MCNC: 0.91 MG/DL — SIGNIFICANT CHANGE UP (ref 0.5–1.3)
GLUCOSE SERPL-MCNC: 102 MG/DL — HIGH (ref 70–99)
GLUCOSE SERPL-MCNC: 89 MG/DL — SIGNIFICANT CHANGE UP (ref 70–99)
HCT VFR BLD CALC: 35.1 % — SIGNIFICANT CHANGE UP (ref 34.5–45)
HGB BLD-MCNC: 10.7 G/DL — LOW (ref 11.5–15.5)
MAGNESIUM SERPL-MCNC: 2.2 MG/DL — SIGNIFICANT CHANGE UP (ref 1.6–2.6)
MAGNESIUM SERPL-MCNC: 2.2 MG/DL — SIGNIFICANT CHANGE UP (ref 1.6–2.6)
MCHC RBC-ENTMCNC: 24.2 PG — LOW (ref 27–34)
MCHC RBC-ENTMCNC: 30.5 GM/DL — LOW (ref 32–36)
MCV RBC AUTO: 79.4 FL — LOW (ref 80–100)
NRBC # BLD: 0 /100 WBCS — SIGNIFICANT CHANGE UP (ref 0–0)
PHOSPHATE SERPL-MCNC: 3.1 MG/DL — SIGNIFICANT CHANGE UP (ref 2.5–4.5)
PLATELET # BLD AUTO: 390 K/UL — SIGNIFICANT CHANGE UP (ref 150–400)
POTASSIUM SERPL-MCNC: 3.8 MMOL/L — SIGNIFICANT CHANGE UP (ref 3.5–5.3)
POTASSIUM SERPL-MCNC: 4.3 MMOL/L — SIGNIFICANT CHANGE UP (ref 3.5–5.3)
POTASSIUM SERPL-SCNC: 3.8 MMOL/L — SIGNIFICANT CHANGE UP (ref 3.5–5.3)
POTASSIUM SERPL-SCNC: 4.3 MMOL/L — SIGNIFICANT CHANGE UP (ref 3.5–5.3)
RBC # BLD: 4.42 M/UL — SIGNIFICANT CHANGE UP (ref 3.8–5.2)
RBC # FLD: 16.1 % — HIGH (ref 10.3–14.5)
SODIUM SERPL-SCNC: 141 MMOL/L — SIGNIFICANT CHANGE UP (ref 135–145)
SODIUM SERPL-SCNC: 142 MMOL/L — SIGNIFICANT CHANGE UP (ref 135–145)
WBC # BLD: 8.63 K/UL — SIGNIFICANT CHANGE UP (ref 3.8–10.5)
WBC # FLD AUTO: 8.63 K/UL — SIGNIFICANT CHANGE UP (ref 3.8–10.5)

## 2021-05-09 PROCEDURE — 99232 SBSQ HOSP IP/OBS MODERATE 35: CPT | Mod: GC

## 2021-05-09 PROCEDURE — 99233 SBSQ HOSP IP/OBS HIGH 50: CPT

## 2021-05-09 RX ORDER — POTASSIUM CHLORIDE 20 MEQ
20 PACKET (EA) ORAL ONCE
Refills: 0 | Status: COMPLETED | OUTPATIENT
Start: 2021-05-09 | End: 2021-05-09

## 2021-05-09 RX ORDER — METOPROLOL TARTRATE 50 MG
50 TABLET ORAL EVERY 12 HOURS
Refills: 0 | Status: DISCONTINUED | OUTPATIENT
Start: 2021-05-09 | End: 2021-05-12

## 2021-05-09 RX ORDER — METOPROLOL TARTRATE 50 MG
5 TABLET ORAL ONCE
Refills: 0 | Status: COMPLETED | OUTPATIENT
Start: 2021-05-09 | End: 2021-05-09

## 2021-05-09 RX ADMIN — FAMOTIDINE 20 MILLIGRAM(S): 10 INJECTION INTRAVENOUS at 17:42

## 2021-05-09 RX ADMIN — SERTRALINE 100 MILLIGRAM(S): 25 TABLET, FILM COATED ORAL at 11:11

## 2021-05-09 RX ADMIN — Medication 20 MILLIEQUIVALENT(S): at 11:10

## 2021-05-09 RX ADMIN — PREGABALIN 1000 MICROGRAM(S): 225 CAPSULE ORAL at 11:11

## 2021-05-09 RX ADMIN — Medication 81 MILLIGRAM(S): at 11:11

## 2021-05-09 RX ADMIN — Medication 20 MILLIGRAM(S): at 21:04

## 2021-05-09 RX ADMIN — Medication 5 MILLIGRAM(S): at 02:35

## 2021-05-09 RX ADMIN — Medication 50 MILLIGRAM(S): at 17:45

## 2021-05-09 RX ADMIN — Medication 325 MILLIGRAM(S): at 08:32

## 2021-05-09 RX ADMIN — Medication 25 MILLIGRAM(S): at 05:10

## 2021-05-09 RX ADMIN — Medication 1000 UNIT(S): at 11:11

## 2021-05-09 RX ADMIN — Medication 5 MILLIGRAM(S): at 00:27

## 2021-05-09 RX ADMIN — ENOXAPARIN SODIUM 40 MILLIGRAM(S): 100 INJECTION SUBCUTANEOUS at 11:11

## 2021-05-09 RX ADMIN — FAMOTIDINE 20 MILLIGRAM(S): 10 INJECTION INTRAVENOUS at 05:11

## 2021-05-09 RX ADMIN — EZETIMIBE 10 MILLIGRAM(S): 10 TABLET ORAL at 21:04

## 2021-05-09 RX ADMIN — Medication 20 MILLIGRAM(S): at 17:42

## 2021-05-09 RX ADMIN — Medication 20 MILLIGRAM(S): at 05:10

## 2021-05-09 NOTE — PROGRESS NOTE ADULT - PROBLEM SELECTOR PLAN 1
new onset afib with RVR requiring IV metoprolol  - CHADSVASc 4  - would not be ideal candidate for a/c at this time as per previous doctor's conversation with her sister Ghada, she suffers from frequent falls at home and is coming to us here with again fall (this fall was mechanical and witnessed by her sister, patient was rushing into PCP office and tripped)  - discussed re: a/c with sister Ghada who is in agreement that patient has falls almost daily (mostly when she tries to get out of bed) and the risks of bleeding outweighs the benefits of at this time.  - change home carvedilol 3.125mg q12h to metoprolol tartrate, increased to 50mg BID (BPs wnl)  - TTE pending, f/u results  - Cardiology consult recs appreciated

## 2021-05-09 NOTE — PROGRESS NOTE ADULT - PROBLEM SELECTOR PLAN 9
DVT ppx: Lovenox    Discussed with patient's PCP Dr. Morenita Carroll on 5/6/21.    Therese Hayward DO  Pager #: [phone]  Available on Teams francisca    Plan discussed with ACP Priscilla ABBOTT upon discharge, can likely be DC'ed in next 1-2 days depending on echo and tele

## 2021-05-09 NOTE — PROGRESS NOTE ADULT - SUBJECTIVE AND OBJECTIVE BOX
[facility] Division of Hospital Medicine  Therese DO Mainor  Pager (M-F, 3X-9B): [phone]  Other Times:  [phone]    Patient is a 75y old  Female who presents with a chief complaint of S/P presumed mechanical fall (09 May 2021 08:24)      SUBJECTIVE / OVERNIGHT EVENTS: Last night went into afib with RVR. Got pushes of lopressor, converted to sinus. this morning, quite confused. Thinks her sister is nearby. Does not know where she is. Is awake, alert, follows commands. Trying to get out of bed but counseled to please just stay in bed.  ADDITIONAL REVIEW OF SYSTEMS: negative    MEDICATIONS  (STANDING):  aspirin enteric coated 81 milliGRAM(s) Oral daily  cholecalciferol 1000 Unit(s) Oral daily  cyanocobalamin 1000 MICROGram(s) Oral daily  enoxaparin Injectable 40 milliGRAM(s) SubCutaneous daily  ezetimibe 10 milliGRAM(s) Oral at bedtime  famotidine    Tablet 20 milliGRAM(s) Oral two times a day  ferrous    sulfate 325 milliGRAM(s) Oral <User Schedule>  furosemide   Injectable 20 milliGRAM(s) IV Push two times a day  lovastatin 20 milliGRAM(s) Oral at bedtime  metoprolol tartrate 50 milliGRAM(s) Oral every 12 hours  sertraline 100 milliGRAM(s) Oral daily    MEDICATIONS  (PRN):  acetaminophen   Tablet .. 650 milliGRAM(s) Oral every 6 hours PRN Temp greater or equal to 38C (100.4F), Mild Pain (1 - 3)  QUEtiapine 12.5 milliGRAM(s) Oral at bedtime PRN agitation or insomnia      CAPILLARY BLOOD GLUCOSE        I&O's Summary    08 May 2021 07:01  -  09 May 2021 07:00  --------------------------------------------------------  IN: 580 mL / OUT: 901 mL / NET: -321 mL        PHYSICAL EXAM:  Vital Signs Last 24 Hrs  T(C): 37.4 (09 May 2021 11:50), Max: 37.4 (09 May 2021 11:50)  T(F): 99.3 (09 May 2021 11:50), Max: 99.3 (09 May 2021 11:50)  HR: 79 (09 May 2021 11:50) (70 - 121)  BP: 126/60 (09 May 2021 11:50) (111/73 - 131/86)  BP(mean): --  RR: 18 (09 May 2021 11:50) (16 - 18)  SpO2: 92% (09 May 2021 11:50) (92% - 96%)    CONSTITUTIONAL: NAD, well-developed, well-groomed  EYES: PERRLA; conjunctiva and sclera clear  ENMT: Moist oral mucosa, no pharyngeal injection or exudates  NECK: Supple, no palpable masses; no thyromegaly  RESPIRATORY: Normal respiratory effort; lungs are clear to auscultation bilaterally  CARDIOVASCULAR: Regular rate and rhythm, normal S1 and S2, no murmur/rub/gallop; minimal lower extremity edema; Peripheral pulses are 2+ bilaterally  ABDOMEN: Nontender to palpation, normoactive bowel sounds, no rebound/guarding; No hepatosplenomegaly  MUSCULOSKELETAL: no clubbing or cyanosis of digits; no joint swelling or tenderness to palpation  NEUROLOGY: awake, alert, AAOx1, moves all extremities  SKIN: No rashes; no palpable lesions    LABS:                        10.7   8.63  )-----------( 390      ( 09 May 2021 07:03 )             35.1     05-09    142  |  102  |  17  ----------------------------<  89  3.8   |  26  |  0.82    Ca    9.3      09 May 2021 07:03  Phos  3.1     05-09  Mg     2.2     05-09

## 2021-05-09 NOTE — PROGRESS NOTE ADULT - PROBLEM SELECTOR PROBLEM 8
Prophylactic measure
Dementia with behavioral disturbance, unspecified dementia type

## 2021-05-09 NOTE — PROGRESS NOTE ADULT - SUBJECTIVE AND OBJECTIVE BOX
[place] Lima  [phone]  All Cardiology service information can be found 24/7 on [email or web address], password: [email or web address]    Overnight Events: Converted to SR overnight. Otherwise no events. Pt denies any chest pain, sob, or palpitations.     CONSTITUTIONAL:  No fevers or chills  HEENT: No rhinorrhea   SKIN:  No rash or itching.  CARDIOVASCULAR:  No chest pain, chest pressure or chest discomfort. No palpitations or edema.  RESPIRATORY:  No shortness of breath, cough or sputum.  GASTROINTESTINAL:  No nausea, vomiting or diarrhea. No abdominal pain.   GENITOURINARY:  Denies hematuria, dysuria.   NEUROLOGICAL:  No headache, dizziness, syncope.   MUSCULOSKELETAL:  No muscle, back pain, joint pain or stiffness.  HEMATOLOGIC:  No bleeding or bruising.    Current Meds:  acetaminophen   Tablet .. 650 milliGRAM(s) Oral every 6 hours PRN  aspirin enteric coated 81 milliGRAM(s) Oral daily  cholecalciferol 1000 Unit(s) Oral daily  cyanocobalamin 1000 MICROGram(s) Oral daily  enoxaparin Injectable 40 milliGRAM(s) SubCutaneous daily  ezetimibe 10 milliGRAM(s) Oral at bedtime  famotidine    Tablet 20 milliGRAM(s) Oral two times a day  ferrous    sulfate 325 milliGRAM(s) Oral <User Schedule>  furosemide   Injectable 20 milliGRAM(s) IV Push two times a day  lovastatin 20 milliGRAM(s) Oral at bedtime  metoprolol tartrate 50 milliGRAM(s) Oral every 12 hours  QUEtiapine 12.5 milliGRAM(s) Oral at bedtime PRN  sertraline 100 milliGRAM(s) Oral daily      Vitals:  T(F): 97.2 (05-09), Max: 98.5 (05-08)  HR: 106 (05-09) (70 - 121)  BP: 114/70 (05-09) (111/73 - 134/74)  RR: 16 (05-09)  SpO2: 95% (05-09)  I&O's Summary    08 May 2021 07:01  -  09 May 2021 07:00  --------------------------------------------------------  IN: 580 mL / OUT: 901 mL / NET: -321 mL    Physical Exam:  Appearance: No acute distress  Eyes: clear conjunctiva  HEENT: Normal oral mucosa, facial ecchymosis   Cardiovascular: S1, S2, RRR, no JVD   Respiratory: Clear to auscultation bilaterally  Gastrointestinal: soft, non-tender, non-distended with normal bowel sounds  Musculoskeletal: No clubbing; no joint deformity   Neurologic: Non-focal  Lymphatic: No lymphadenopathy  Psychiatry: mood & affect appropriate  Skin: No rashes, ecchymoses, or cyanosis                          10.7   8.63  )-----------( 390      ( 09 May 2021 07:03 )             35.1     05-09    142  |  102  |  17  ----------------------------<  89  3.8   |  26  |  0.82    Ca    9.3      09 May 2021 07:03  Phos  3.1     05-09  Mg     2.2     05-09        CARDIAC MARKERS ( 06 May 2021 01:13 )  15 ng/L / x     / x     / x     / x     / x      CARDIAC MARKERS ( 05 May 2021 23:01 )  14 ng/L / x     / x     / x     / x     / x          Serum Pro-Brain Natriuretic Peptide: 617 pg/mL (05-05 @ 23:01)    Interpretation of Telemetry: afib converted to NSR

## 2021-05-09 NOTE — PROGRESS NOTE ADULT - PROBLEM SELECTOR PLAN 2
patient with progressively worsening lower extrema edema. per sister, no history/diagnosis of CHF.   - per PCP office and sister patient presented to PCP on 4/28/21 with worsening lower extremity pain and swelling. was prescribed bactrim DS x 7 days which she started on 4/28 with no improvement, prompting her to return to PCP on 5/5 (where she unfortunately had fall in the office)  - discussed with ID attending Dr. Mullins, her lower extremity erythema looks more consistent with venous stasis changes. afebrile with no leukocytosis. thus antibiotics discontinued on 5/6.  - concern for possible HF. no prior dx. or possible paroxysmal afib with RVR leading to flash pulmonary edema.  - BNP mildly elevated 617  - cont lasix to 20mg IV BID  - strict I/Os, daily weights  - f/u TTE which is still pending  - duplex negative for DVT b/l  - continue to follow blood cultures, which show NGTD

## 2021-05-09 NOTE — PROGRESS NOTE ADULT - ATTENDING COMMENTS
75 year old woman with AF presents with fall, facial trauma and multiple prior falls.   --Agree with Cardiology Fellow assessment and plan.  --High CHADs vasc score but per report, risk/benefit discussion with family suggested she may be poor candidate.   --Check TTE and monitor on telemetry.  --May benefit from eventual EP evaluation given recurrent falls and dysrhythmia.  --Will follow.
75 year old woman with AF presents with fall, facial trauma and multiple prior falls.   --Agree with Cardiology Fellow assessment and plan.  --Reverted back to SR with short conversion pause; multiple prior falls.  --High CHADs vasc score but per report, risk/benefit discussion with family suggested she may be poor candiadte.   --Check TTE and monitor on telemetry; may benefit from eventual EP evaluation.  --Will follow.

## 2021-05-10 LAB
ANION GAP SERPL CALC-SCNC: 14 MMOL/L — SIGNIFICANT CHANGE UP (ref 5–17)
BUN SERPL-MCNC: 19 MG/DL — SIGNIFICANT CHANGE UP (ref 7–23)
CALCIUM SERPL-MCNC: 9 MG/DL — SIGNIFICANT CHANGE UP (ref 8.4–10.5)
CHLORIDE SERPL-SCNC: 103 MMOL/L — SIGNIFICANT CHANGE UP (ref 96–108)
CO2 SERPL-SCNC: 23 MMOL/L — SIGNIFICANT CHANGE UP (ref 22–31)
CREAT SERPL-MCNC: 0.82 MG/DL — SIGNIFICANT CHANGE UP (ref 0.5–1.3)
GLUCOSE SERPL-MCNC: 92 MG/DL — SIGNIFICANT CHANGE UP (ref 70–99)
HCT VFR BLD CALC: 33.4 % — LOW (ref 34.5–45)
HGB BLD-MCNC: 10.5 G/DL — LOW (ref 11.5–15.5)
MAGNESIUM SERPL-MCNC: 2.2 MG/DL — SIGNIFICANT CHANGE UP (ref 1.6–2.6)
MCHC RBC-ENTMCNC: 24.9 PG — LOW (ref 27–34)
MCHC RBC-ENTMCNC: 31.4 GM/DL — LOW (ref 32–36)
MCV RBC AUTO: 79.1 FL — LOW (ref 80–100)
NRBC # BLD: 0 /100 WBCS — SIGNIFICANT CHANGE UP (ref 0–0)
PHOSPHATE SERPL-MCNC: 3.1 MG/DL — SIGNIFICANT CHANGE UP (ref 2.5–4.5)
PLATELET # BLD AUTO: 407 K/UL — HIGH (ref 150–400)
POTASSIUM SERPL-MCNC: 4 MMOL/L — SIGNIFICANT CHANGE UP (ref 3.5–5.3)
POTASSIUM SERPL-SCNC: 4 MMOL/L — SIGNIFICANT CHANGE UP (ref 3.5–5.3)
RBC # BLD: 4.22 M/UL — SIGNIFICANT CHANGE UP (ref 3.8–5.2)
RBC # FLD: 16.3 % — HIGH (ref 10.3–14.5)
SODIUM SERPL-SCNC: 140 MMOL/L — SIGNIFICANT CHANGE UP (ref 135–145)
WBC # BLD: 8.76 K/UL — SIGNIFICANT CHANGE UP (ref 3.8–10.5)
WBC # FLD AUTO: 8.76 K/UL — SIGNIFICANT CHANGE UP (ref 3.8–10.5)

## 2021-05-10 PROCEDURE — 93306 TTE W/DOPPLER COMPLETE: CPT | Mod: 26

## 2021-05-10 PROCEDURE — 99233 SBSQ HOSP IP/OBS HIGH 50: CPT

## 2021-05-10 RX ORDER — MEMANTINE HYDROCHLORIDE 10 MG/1
10 TABLET ORAL
Refills: 0 | Status: DISCONTINUED | OUTPATIENT
Start: 2021-05-10 | End: 2021-05-12

## 2021-05-10 RX ORDER — FUROSEMIDE 40 MG
40 TABLET ORAL DAILY
Refills: 0 | Status: DISCONTINUED | OUTPATIENT
Start: 2021-05-10 | End: 2021-05-12

## 2021-05-10 RX ADMIN — Medication 50 MILLIGRAM(S): at 17:40

## 2021-05-10 RX ADMIN — Medication 20 MILLIGRAM(S): at 22:23

## 2021-05-10 RX ADMIN — EZETIMIBE 10 MILLIGRAM(S): 10 TABLET ORAL at 22:23

## 2021-05-10 RX ADMIN — Medication 1000 UNIT(S): at 12:14

## 2021-05-10 RX ADMIN — FAMOTIDINE 20 MILLIGRAM(S): 10 INJECTION INTRAVENOUS at 05:58

## 2021-05-10 RX ADMIN — Medication 20 MILLIGRAM(S): at 05:59

## 2021-05-10 RX ADMIN — PREGABALIN 1000 MICROGRAM(S): 225 CAPSULE ORAL at 12:15

## 2021-05-10 RX ADMIN — Medication 50 MILLIGRAM(S): at 05:58

## 2021-05-10 RX ADMIN — FAMOTIDINE 20 MILLIGRAM(S): 10 INJECTION INTRAVENOUS at 17:40

## 2021-05-10 RX ADMIN — QUETIAPINE FUMARATE 12.5 MILLIGRAM(S): 200 TABLET, FILM COATED ORAL at 00:26

## 2021-05-10 RX ADMIN — Medication 81 MILLIGRAM(S): at 12:15

## 2021-05-10 RX ADMIN — ENOXAPARIN SODIUM 40 MILLIGRAM(S): 100 INJECTION SUBCUTANEOUS at 12:14

## 2021-05-10 RX ADMIN — SERTRALINE 100 MILLIGRAM(S): 25 TABLET, FILM COATED ORAL at 12:15

## 2021-05-10 NOTE — PROGRESS NOTE ADULT - PROBLEM SELECTOR PLAN 6
history of Alzheimer's dementia. baseline AOx 2  - holding namenda as a BEERS risk  - c/w sertraline 100mg daily  - c/w seroquel 12.5mg qHS PRN Alzheimer's dementia  Resume Namenda  Continue sertraline 100 mg daily  Continue Seroquel 12.5 mg qHS PRN

## 2021-05-10 NOTE — PROGRESS NOTE ADULT - SUBJECTIVE AND OBJECTIVE BOX
Rubens Quevedo MD  Pager [phone]  Spectra [phone]  Cell Phone [phone]    Patient is a 75y old  Female who presents with a chief complaint of S/P presumed mechanical fall (09 May 2021 14:00)        SUBJECTIVE / OVERNIGHT EVENTS: Patient offering no complaints  SR 60-80's PVCs, couplets      MEDICATIONS  (STANDING):  aspirin enteric coated 81 milliGRAM(s) Oral daily  cholecalciferol 1000 Unit(s) Oral daily  cyanocobalamin 1000 MICROGram(s) Oral daily  enoxaparin Injectable 40 milliGRAM(s) SubCutaneous daily  ezetimibe 10 milliGRAM(s) Oral at bedtime  famotidine    Tablet 20 milliGRAM(s) Oral two times a day  ferrous    sulfate 325 milliGRAM(s) Oral <User Schedule>  furosemide   Injectable 20 milliGRAM(s) IV Push two times a day  lovastatin 20 milliGRAM(s) Oral at bedtime  metoprolol tartrate 50 milliGRAM(s) Oral every 12 hours  sertraline 100 milliGRAM(s) Oral daily    MEDICATIONS  (PRN):  acetaminophen   Tablet .. 650 milliGRAM(s) Oral every 6 hours PRN Temp greater or equal to 38C (100.4F), Mild Pain (1 - 3)  QUEtiapine 12.5 milliGRAM(s) Oral at bedtime PRN agitation or insomnia      Vital Signs Last 24 Hrs  T(C): 36.8 (10 May 2021 12:13), Max: 36.8 (10 May 2021 12:13)  T(F): 98.3 (10 May 2021 12:13), Max: 98.3 (10 May 2021 12:13)  HR: 64 (10 May 2021 12:13) (64 - 86)  BP: 123/71 (10 May 2021 12:13) (107/60 - 137/82)  BP(mean): --  RR: 18 (10 May 2021 12:13) (18 - 18)  SpO2: 97% (10 May 2021 12:13) (93% - 97%)  CAPILLARY BLOOD GLUCOSE        I&O's Summary    09 May 2021 07:01  -  10 May 2021 07:00  --------------------------------------------------------  IN: 480 mL / OUT: 330 mL / NET: 150 mL          PHYSICAL EXAM  GENERAL: NAD, well-developed  HEAD:  R frontal hematoma with ecchymosis surrounding both eyes  EYES: EOMI, PERRLA, conjunctiva and sclera clear  CHEST/LUNG: Clear to auscultation anteriorly  HEART: +S1+S2, regular   ABDOMEN: Soft, Nontender, Nondistended; Obese; Bowel sounds present  EXTREMITIES:  2+ Peripheral Pulses, No clubbing, cyanosis, or edema  PSYCH: AAOx1 (person)      LABS:                        10.5   8.76  )-----------( 407      ( 10 May 2021 06:12 )             33.4     05-10    140  |  103  |  19  ----------------------------<  92  4.0   |  23  |  0.82    Ca    9.0      10 May 2021 06:14  Phos  3.1     05-10  Mg     2.2     05-10                  RADIOLOGY & ADDITIONAL TESTS:    Imaging Personally Reviewed:  Consultant(s) Notes Reviewed:    Care Discussed with Consultants/Other Providers:

## 2021-05-10 NOTE — PROGRESS NOTE ADULT - PROBLEM SELECTOR PLAN 1
Currently in SR  Patient not a good candidate for AC due to frequent, almost daily falls  Continue metoprolol tartrate 50 mg BID (BPs wnl)  Await TTE   Cardiology following

## 2021-05-10 NOTE — PROGRESS NOTE ADULT - PROBLEM SELECTOR PLAN 5
Continue Lovastatin 20mg qHS  Continue zetia 10mg qHS Continue Lovastatin 20 mg qHS  Continue Zetia 10 mg qHS

## 2021-05-10 NOTE — PROGRESS NOTE ADULT - PROBLEM SELECTOR PLAN 2
Improved   Continue Lasix 20 mg IV BID  Await TTE   Duplex negative for DVT b/l Improved   Change Lasix to 40 mg PO daily  Await TTE   Duplex negative for DVT b/l

## 2021-05-11 LAB
ANION GAP SERPL CALC-SCNC: 14 MMOL/L — SIGNIFICANT CHANGE UP (ref 5–17)
BUN SERPL-MCNC: 22 MG/DL — SIGNIFICANT CHANGE UP (ref 7–23)
CALCIUM SERPL-MCNC: 8.8 MG/DL — SIGNIFICANT CHANGE UP (ref 8.4–10.5)
CHLORIDE SERPL-SCNC: 102 MMOL/L — SIGNIFICANT CHANGE UP (ref 96–108)
CO2 SERPL-SCNC: 23 MMOL/L — SIGNIFICANT CHANGE UP (ref 22–31)
CREAT SERPL-MCNC: 0.84 MG/DL — SIGNIFICANT CHANGE UP (ref 0.5–1.3)
CULTURE RESULTS: SIGNIFICANT CHANGE UP
CULTURE RESULTS: SIGNIFICANT CHANGE UP
GLUCOSE SERPL-MCNC: 82 MG/DL — SIGNIFICANT CHANGE UP (ref 70–99)
POTASSIUM SERPL-MCNC: 3.8 MMOL/L — SIGNIFICANT CHANGE UP (ref 3.5–5.3)
POTASSIUM SERPL-SCNC: 3.8 MMOL/L — SIGNIFICANT CHANGE UP (ref 3.5–5.3)
SARS-COV-2 RNA SPEC QL NAA+PROBE: SIGNIFICANT CHANGE UP
SODIUM SERPL-SCNC: 139 MMOL/L — SIGNIFICANT CHANGE UP (ref 135–145)
SPECIMEN SOURCE: SIGNIFICANT CHANGE UP
SPECIMEN SOURCE: SIGNIFICANT CHANGE UP

## 2021-05-11 PROCEDURE — 99232 SBSQ HOSP IP/OBS MODERATE 35: CPT

## 2021-05-11 RX ADMIN — PREGABALIN 1000 MICROGRAM(S): 225 CAPSULE ORAL at 12:01

## 2021-05-11 RX ADMIN — ENOXAPARIN SODIUM 40 MILLIGRAM(S): 100 INJECTION SUBCUTANEOUS at 12:01

## 2021-05-11 RX ADMIN — Medication 81 MILLIGRAM(S): at 11:59

## 2021-05-11 RX ADMIN — Medication 40 MILLIGRAM(S): at 05:19

## 2021-05-11 RX ADMIN — Medication 50 MILLIGRAM(S): at 05:18

## 2021-05-11 RX ADMIN — Medication 1000 UNIT(S): at 11:59

## 2021-05-11 RX ADMIN — FAMOTIDINE 20 MILLIGRAM(S): 10 INJECTION INTRAVENOUS at 05:17

## 2021-05-11 RX ADMIN — FAMOTIDINE 20 MILLIGRAM(S): 10 INJECTION INTRAVENOUS at 17:07

## 2021-05-11 RX ADMIN — Medication 20 MILLIGRAM(S): at 22:06

## 2021-05-11 RX ADMIN — Medication 50 MILLIGRAM(S): at 17:07

## 2021-05-11 RX ADMIN — MEMANTINE HYDROCHLORIDE 10 MILLIGRAM(S): 10 TABLET ORAL at 05:19

## 2021-05-11 RX ADMIN — MEMANTINE HYDROCHLORIDE 10 MILLIGRAM(S): 10 TABLET ORAL at 17:07

## 2021-05-11 RX ADMIN — Medication 325 MILLIGRAM(S): at 06:15

## 2021-05-11 RX ADMIN — EZETIMIBE 10 MILLIGRAM(S): 10 TABLET ORAL at 22:06

## 2021-05-11 RX ADMIN — SERTRALINE 100 MILLIGRAM(S): 25 TABLET, FILM COATED ORAL at 12:01

## 2021-05-11 NOTE — PROGRESS NOTE ADULT - SUBJECTIVE AND OBJECTIVE BOX
Rubens Quevedo MD  Pager [phone]  Spectra [phone]  Cell Phone [phone]    Patient is a 75y old  Female who presents with a chief complaint of S/P presumed mechanical fall (10 May 2021 12:49)        SUBJECTIVE / OVERNIGHT EVENTS: No new events  TELEMETRY: SR 60-80's      MEDICATIONS  (STANDING):  aspirin enteric coated 81 milliGRAM(s) Oral daily  cholecalciferol 1000 Unit(s) Oral daily  cyanocobalamin 1000 MICROGram(s) Oral daily  enoxaparin Injectable 40 milliGRAM(s) SubCutaneous daily  ezetimibe 10 milliGRAM(s) Oral at bedtime  famotidine    Tablet 20 milliGRAM(s) Oral two times a day  ferrous    sulfate 325 milliGRAM(s) Oral <User Schedule>  furosemide    Tablet 40 milliGRAM(s) Oral daily  lovastatin 20 milliGRAM(s) Oral at bedtime  memantine 10 milliGRAM(s) Oral two times a day  metoprolol tartrate 50 milliGRAM(s) Oral every 12 hours  sertraline 100 milliGRAM(s) Oral daily    MEDICATIONS  (PRN):  acetaminophen   Tablet .. 650 milliGRAM(s) Oral every 6 hours PRN Temp greater or equal to 38C (100.4F), Mild Pain (1 - 3)  QUEtiapine 12.5 milliGRAM(s) Oral at bedtime PRN agitation or insomnia      Vital Signs Last 24 Hrs  T(C): 36.7 (11 May 2021 13:01), Max: 37.1 (10 May 2021 20:26)  T(F): 98.1 (11 May 2021 13:01), Max: 98.7 (10 May 2021 20:26)  HR: 85 (11 May 2021 13:01) (74 - 85)  BP: 104/59 (11 May 2021 13:01) (93/52 - 127/71)  BP(mean): 83 (10 May 2021 21:26) (83 - 83)  RR: 17 (11 May 2021 13:01) (17 - 18)  SpO2: 97% (11 May 2021 13:01) (97% - 98%)  CAPILLARY BLOOD GLUCOSE        I&O's Summary    11 May 2021 07:01  -  11 May 2021 14:07  --------------------------------------------------------  IN: 360 mL / OUT: 1350 mL / NET: -990 mL          PHYSICAL EXAM  GENERAL: NAD, well-developed  HEAD:  R frontal hematoma with ecchymosis surrounding both eyes  EYES: EOMI, PERRLA, conjunctiva and sclera clear  CHEST/LUNG: Clear to auscultation anteriorly  HEART: +S1+S2, regular   ABDOMEN: Soft, Nontender, Nondistended; Obese; Bowel sounds present  EXTREMITIES:  2+ Peripheral Pulses, No clubbing, cyanosis, or edema  PSYCH: Whitney (person)      LABS:                        10.5   8.76  )-----------( 407      ( 10 May 2021 06:12 )             33.4     05-11    139  |  102  |  22  ----------------------------<  82  3.8   |  23  |  0.84    Ca    8.8      11 May 2021 06:17  Phos  3.1     05-10  Mg     2.2     05-10                  RADIOLOGY & ADDITIONAL TESTS:    Imaging Personally Reviewed:  Consultant(s) Notes Reviewed:    Care Discussed with Consultants/Other Providers:

## 2021-05-11 NOTE — PROGRESS NOTE ADULT - PROBLEM SELECTOR PLAN 6
Alzheimer's dementia  Resumed Namenda 10 mg PO BID  Continue sertraline 100 mg daily  Continue Seroquel 12.5 mg qHS PRN

## 2021-05-11 NOTE — PROGRESS NOTE ADULT - PROBLEM SELECTOR PLAN 1
Remains in SR  Patient not a good candidate for AC due to frequent, almost daily falls  Continue metoprolol tartrate 50 mg BID (BPs wnl)  TTE reviewed, EF 60-65%, grossly unremarkable  Cardiology following

## 2021-05-11 NOTE — CDI QUERY NOTE - NSCDIOTHERTXTBX_GEN_ALL_CORE_HH
The patient presented to ED s/p fall.  She has a nasal fracture and right scalp contusion.    She has a documented past medical history of:  Dementia, HLD, HTN Obesity and bilateral lower extremity Cellulitis on oral antibiotics.    H&P:  patient also with clinical evidence of congestive heart failure.  Upgraded to telemetry given presentation of heart failure.  Echo.  Troponin assay.  Would consider contacting patient's cardiologist, Dr. Ireland, in the AM for evaluation.   Problem: Chronic congestive heart failure, unspecified heart failure type.  Plan: See above.  Will continue with IV Lasix 20 mg daily for now.  Echo.  Daily weights.     Internal Medicine PN 5/6:  Problem: Lower extremity edema.  Plan: patient with progressively worsening lower extrema edema. per sister, no history/diagnosis of CHF.  - BNP mildly elevated 617  continue lasix 20mg IVP daily as patient is lasix naive    Internal [facility] PN 5/7:  - concern for possible HF as also has crackles on exam. no prior dx. or possible paroxysmal afib with RVR leading to flash pulmonary edema.    Internal Medicine PN 5/11:  Problem: Lower extremity edema.  Plan: Resolved  Continue Lasix 40 mg PO daily  TTE unremarkable    TTE 5/10/21:  EF (Visual Estimate): 60-65 %  Conclusions:  1. Mitral annular calcification and calcified mitral  leaflets with normal diastolic opening.  2. Calcified trileaflet aortic valve with normal opening.  3. Increased relative wall thickness with normal left  ventricular mass index, consistent with concentric left  ventricular remodeling.  4. Endocardium not well visualized; grossly normal left  ventricular systolic function.  5. Normal diastolic function  6. Normal right ventricular size and function.  7. Normal pericardium with trace pericardial effusion.  *** No previous Echo exam.    QUERY:  Based on your judgment and above clinical indicators would you please clarify theclinical signficance for the IV Lasix after study?    (1) Flash Pulmonary Edema, resolved  (2) Clinically Insignificant  (3) Other (please specify)  (4) Clinically Undetermined    Thank you for your help,    Marta

## 2021-05-12 ENCOUNTER — TRANSCRIPTION ENCOUNTER (OUTPATIENT)
Age: 76
End: 2021-05-12

## 2021-05-12 VITALS
HEART RATE: 77 BPM | DIASTOLIC BLOOD PRESSURE: 68 MMHG | OXYGEN SATURATION: 98 % | SYSTOLIC BLOOD PRESSURE: 120 MMHG | RESPIRATION RATE: 16 BRPM | TEMPERATURE: 98 F

## 2021-05-12 LAB
ANION GAP SERPL CALC-SCNC: 14 MMOL/L — SIGNIFICANT CHANGE UP (ref 5–17)
BUN SERPL-MCNC: 20 MG/DL — SIGNIFICANT CHANGE UP (ref 7–23)
CALCIUM SERPL-MCNC: 9 MG/DL — SIGNIFICANT CHANGE UP (ref 8.4–10.5)
CHLORIDE SERPL-SCNC: 101 MMOL/L — SIGNIFICANT CHANGE UP (ref 96–108)
CO2 SERPL-SCNC: 23 MMOL/L — SIGNIFICANT CHANGE UP (ref 22–31)
CREAT SERPL-MCNC: 0.82 MG/DL — SIGNIFICANT CHANGE UP (ref 0.5–1.3)
GLUCOSE SERPL-MCNC: 96 MG/DL — SIGNIFICANT CHANGE UP (ref 70–99)
HCT VFR BLD CALC: 37.1 % — SIGNIFICANT CHANGE UP (ref 34.5–45)
HGB BLD-MCNC: 11.4 G/DL — LOW (ref 11.5–15.5)
MCHC RBC-ENTMCNC: 24.4 PG — LOW (ref 27–34)
MCHC RBC-ENTMCNC: 30.7 GM/DL — LOW (ref 32–36)
MCV RBC AUTO: 79.4 FL — LOW (ref 80–100)
NRBC # BLD: 0 /100 WBCS — SIGNIFICANT CHANGE UP (ref 0–0)
PLATELET # BLD AUTO: 467 K/UL — HIGH (ref 150–400)
POTASSIUM SERPL-MCNC: 4.2 MMOL/L — SIGNIFICANT CHANGE UP (ref 3.5–5.3)
POTASSIUM SERPL-SCNC: 4.2 MMOL/L — SIGNIFICANT CHANGE UP (ref 3.5–5.3)
RBC # BLD: 4.67 M/UL — SIGNIFICANT CHANGE UP (ref 3.8–5.2)
RBC # FLD: 16.4 % — HIGH (ref 10.3–14.5)
SARS-COV-2 RNA SPEC QL NAA+PROBE: SIGNIFICANT CHANGE UP
SODIUM SERPL-SCNC: 138 MMOL/L — SIGNIFICANT CHANGE UP (ref 135–145)
WBC # BLD: 8.77 K/UL — SIGNIFICANT CHANGE UP (ref 3.8–10.5)
WBC # FLD AUTO: 8.77 K/UL — SIGNIFICANT CHANGE UP (ref 3.8–10.5)

## 2021-05-12 PROCEDURE — 87641 MR-STAPH DNA AMP PROBE: CPT

## 2021-05-12 PROCEDURE — 71045 X-RAY EXAM CHEST 1 VIEW: CPT

## 2021-05-12 PROCEDURE — 87640 STAPH A DNA AMP PROBE: CPT

## 2021-05-12 PROCEDURE — 74177 CT ABD & PELVIS W/CONTRAST: CPT

## 2021-05-12 PROCEDURE — 82607 VITAMIN B-12: CPT

## 2021-05-12 PROCEDURE — 85610 PROTHROMBIN TIME: CPT

## 2021-05-12 PROCEDURE — 93970 EXTREMITY STUDY: CPT

## 2021-05-12 PROCEDURE — 97530 THERAPEUTIC ACTIVITIES: CPT

## 2021-05-12 PROCEDURE — 81003 URINALYSIS AUTO W/O SCOPE: CPT

## 2021-05-12 PROCEDURE — 85025 COMPLETE CBC W/AUTO DIFF WBC: CPT

## 2021-05-12 PROCEDURE — 84484 ASSAY OF TROPONIN QUANT: CPT

## 2021-05-12 PROCEDURE — 76377 3D RENDER W/INTRP POSTPROCES: CPT

## 2021-05-12 PROCEDURE — 83735 ASSAY OF MAGNESIUM: CPT

## 2021-05-12 PROCEDURE — 84100 ASSAY OF PHOSPHORUS: CPT

## 2021-05-12 PROCEDURE — 99285 EMERGENCY DEPT VISIT HI MDM: CPT

## 2021-05-12 PROCEDURE — 83540 ASSAY OF IRON: CPT

## 2021-05-12 PROCEDURE — U0005: CPT

## 2021-05-12 PROCEDURE — 80048 BASIC METABOLIC PNL TOTAL CA: CPT

## 2021-05-12 PROCEDURE — 85730 THROMBOPLASTIN TIME PARTIAL: CPT

## 2021-05-12 PROCEDURE — 80053 COMPREHEN METABOLIC PANEL: CPT

## 2021-05-12 PROCEDURE — 93306 TTE W/DOPPLER COMPLETE: CPT

## 2021-05-12 PROCEDURE — U0003: CPT

## 2021-05-12 PROCEDURE — 72125 CT NECK SPINE W/O DYE: CPT

## 2021-05-12 PROCEDURE — 84466 ASSAY OF TRANSFERRIN: CPT

## 2021-05-12 PROCEDURE — 70486 CT MAXILLOFACIAL W/O DYE: CPT

## 2021-05-12 PROCEDURE — 86769 SARS-COV-2 COVID-19 ANTIBODY: CPT

## 2021-05-12 PROCEDURE — 87040 BLOOD CULTURE FOR BACTERIA: CPT

## 2021-05-12 PROCEDURE — 93005 ELECTROCARDIOGRAM TRACING: CPT

## 2021-05-12 PROCEDURE — 83880 ASSAY OF NATRIURETIC PEPTIDE: CPT

## 2021-05-12 PROCEDURE — 73590 X-RAY EXAM OF LOWER LEG: CPT

## 2021-05-12 PROCEDURE — 99239 HOSP IP/OBS DSCHRG MGMT >30: CPT

## 2021-05-12 PROCEDURE — 82728 ASSAY OF FERRITIN: CPT

## 2021-05-12 PROCEDURE — 97162 PT EVAL MOD COMPLEX 30 MIN: CPT

## 2021-05-12 PROCEDURE — 82746 ASSAY OF FOLIC ACID SERUM: CPT

## 2021-05-12 PROCEDURE — 83550 IRON BINDING TEST: CPT

## 2021-05-12 PROCEDURE — 85027 COMPLETE CBC AUTOMATED: CPT

## 2021-05-12 PROCEDURE — 70450 CT HEAD/BRAIN W/O DYE: CPT

## 2021-05-12 RX ORDER — CARVEDILOL PHOSPHATE 80 MG/1
1 CAPSULE, EXTENDED RELEASE ORAL
Qty: 0 | Refills: 0 | DISCHARGE

## 2021-05-12 RX ORDER — FUROSEMIDE 40 MG
1 TABLET ORAL
Qty: 0 | Refills: 0 | DISCHARGE
Start: 2021-05-12

## 2021-05-12 RX ORDER — FERROUS SULFATE 325(65) MG
1 TABLET ORAL
Qty: 0 | Refills: 0 | DISCHARGE
Start: 2021-05-12

## 2021-05-12 RX ORDER — METOPROLOL TARTRATE 50 MG
1 TABLET ORAL
Qty: 0 | Refills: 0 | DISCHARGE
Start: 2021-05-12

## 2021-05-12 RX ORDER — LANOLIN ALCOHOL/MO/W.PET/CERES
1 CREAM (GRAM) TOPICAL
Qty: 0 | Refills: 0 | DISCHARGE

## 2021-05-12 RX ORDER — AZTREONAM 2 G
1 VIAL (EA) INJECTION
Qty: 0 | Refills: 0 | DISCHARGE

## 2021-05-12 RX ADMIN — FAMOTIDINE 20 MILLIGRAM(S): 10 INJECTION INTRAVENOUS at 05:05

## 2021-05-12 RX ADMIN — Medication 40 MILLIGRAM(S): at 05:05

## 2021-05-12 RX ADMIN — MEMANTINE HYDROCHLORIDE 10 MILLIGRAM(S): 10 TABLET ORAL at 05:05

## 2021-05-12 RX ADMIN — ENOXAPARIN SODIUM 40 MILLIGRAM(S): 100 INJECTION SUBCUTANEOUS at 12:07

## 2021-05-12 RX ADMIN — Medication 50 MILLIGRAM(S): at 05:05

## 2021-05-12 RX ADMIN — Medication 81 MILLIGRAM(S): at 12:08

## 2021-05-12 RX ADMIN — PREGABALIN 1000 MICROGRAM(S): 225 CAPSULE ORAL at 12:08

## 2021-05-12 RX ADMIN — Medication 1000 UNIT(S): at 12:07

## 2021-05-12 RX ADMIN — SERTRALINE 100 MILLIGRAM(S): 25 TABLET, FILM COATED ORAL at 12:07

## 2021-05-12 NOTE — PROGRESS NOTE ADULT - PROBLEM SELECTOR PROBLEM 7
Discharge planning issues
Hyperlipidemia
Dementia with behavioral disturbance, unspecified dementia type
Discharge planning issues
Discharge planning issues
Hyperlipidemia
Hyperlipidemia

## 2021-05-12 NOTE — DISCHARGE NOTE PROVIDER - PROVIDER TOKENS
PROVIDER:[TOKEN:[[id number]],FOLLOWUP:[2 weeks]],PROVIDER:[TOKEN:[[id number]],FOLLOWUP:[1 week]]

## 2021-05-12 NOTE — DISCHARGE NOTE PROVIDER - CARE PROVIDER_API CALL
Morenita Sterling (DO)  Family Medicine  [address] [place] [address]  Phone: [phone]  Fax: [phone]  Follow Up Time: 2 weeks    Clifton Parkinson)  Surgery  [facility]  [address] [place] [address], [address]  [place] [address]  Phone: [phone]  Fax: [phone]  Follow Up Time: 1 week

## 2021-05-12 NOTE — DISCHARGE NOTE PROVIDER - HOSPITAL COURSE
74 yo female with PMH of dementia, HTN, HLD, morbid obesity who presents from her PCP office for witnessed mechanical fall found to have non-displaced nasal fracture admitted for lower extremity edema and pulmonary edema concerning for heart failure along with new onset atrial fibrillation with RVR  ·  Problem: New onset atrial fibrillation now nsr, no ac 2/2 falls TTE reviewed, EF 60-65%, grossly unremarkable  ·  Problem: Lower extremity edema. Continue Lasix 40 mg PO daily  .  Problem: Microcytic anemia.  Plan: Iron and ferritin low PO iron supplementation.   ·  Problem: Closed fracture of nasal bone, initial encounter.  Plan: Non-displaced nasal fracture. s/p suture of nasal bridge lac by Plastics Surgery  Outpatient f/u with Dr. Parkinson in 1-2 weeks after discharge.   ·  Problem: Hyperlipidemia.  Plan: Continue Lovastatin 20 mg qHS, Zetia 10 mg qHS.   .  Problem: Dementia with behavioral disturbance, unspecified dementia type. Plan: Alzheimer's dementia, Resumed Namenda 10 mg PO BID  Continue sertraline 100 mg daily, Continue Seroquel 12.5 mg qHS PRN.

## 2021-05-12 NOTE — PROGRESS NOTE ADULT - PROBLEM SELECTOR PLAN 4
Non-displaced nasal fracture. s/p suture of nasal bridge lac by Plastics Surgery  Outpatient f/u with Dr. Parkinson in 1-2 weeks after discharge
non-displaced nasal fracture. s/p suture of nasal bridge lac by Plastics Surgery  - Plastic Surgery recs appreciated  - ice pack, elevated HOB  - outpatient f/u with Dr. Parkinson in 1-2 weeks after discharge
patient with progressively worsening lower extrema edema. per sister, no history/diagnosis of CHF.  - BNP mildly elevated 617  - f/u TTE   - f/u duplex of lower extremities to r/o DVT  - continue lasix 20mg IVP daily as patient is lasix naive  - strict I/Os, daily weights
Non-displaced nasal fracture. s/p suture of nasal bridge lac by Plastics Surgery  Outpatient f/u with Dr. Parkinson in 1-2 weeks after discharge
non-displaced nasal fracture. s/p suture of nasal bridge lac by Plastics Surgery  - Plastic Surgery recs appreciated  - ice pack, elevated HOB  - outpatient f/u with Dr. Parkinson in 1-2 weeks after discharge
Non-displaced nasal fracture. s/p suture of nasal bridge lac by Plastics Surgery  Outpatient f/u with Dr. Parkinson in 1-2 weeks after discharge
non-displaced nasal fracture. s/p suture of nasal bridge lac by Plastics Surgery  - Plastic Surgery recs appreciated  - ice pack, elevated HOB  - outpatient f/u with Dr. Parkinson in 1-2 weeks after discharge

## 2021-05-12 NOTE — DISCHARGE NOTE PROVIDER - NSDCCPCAREPLAN_GEN_ALL_CORE_FT
PRINCIPAL DISCHARGE DIAGNOSIS  Diagnosis: Fall, initial encounter  Assessment and Plan of Treatment: s/p fall W nasal bone fx and right scalp contusion   CTH negative      SECONDARY DISCHARGE DIAGNOSES  Diagnosis: New onset atrial fibrillation  Assessment and Plan of Treatment: NO AC 2/2 to falls   ON [employer]    Diagnosis: Essential hypertension  Assessment and Plan of Treatment: Low salt diet  Activity as tolerated.  Take all medication as prescribed.  Follow up with your medical doctor for routine blood pressure monitoring at your next visit.  Notify your doctor if you have any of the following symptoms:   Dizziness, Lightheadedness, Blurry vision, Headache, Chest pain, Shortness of breath

## 2021-05-12 NOTE — PROGRESS NOTE ADULT - PROBLEM SELECTOR PLAN 1
Remains in SR  Patient not a good candidate for AC due to frequent, almost daily falls  Continue metoprolol tartrate 50 mg BID (BPs wnl)  TTE reviewed, EF 60-65%, grossly unremarkable

## 2021-05-12 NOTE — PROGRESS NOTE ADULT - PROBLEM SELECTOR PROBLEM 4
Closed fracture of nasal bone, initial encounter
Lower extremity edema

## 2021-05-12 NOTE — PROGRESS NOTE ADULT - PROBLEM SELECTOR PROBLEM 2
Closed fracture of nasal bone, initial encounter
Lower extremity edema

## 2021-05-12 NOTE — PROGRESS NOTE ADULT - PROBLEM SELECTOR PLAN 6
Alzheimer's dementia  Continue Namenda 10 mg PO BID  Continue sertraline 100 mg daily  Continue Seroquel 12.5 mg qHS PRN

## 2021-05-12 NOTE — PROGRESS NOTE ADULT - ASSESSMENT
75 y /o F with hx of Alzheimers, HTN, HLD, obesity, b/l cellulitis presents with a mechanical fall with nasal fracture and R scalp contusion. Cardiology c/s for new onset Afib.    #AFib-  -It is possible that these are episodes of paroxysmal Afib that result in conversion pauses to sinus rhythm which may lead to her frequent falls.   -She converted on 5/7 at ~3pm to NSR with short conversion pause of 1.4 seconds. Remains in NSR as of today.  -Continue metoprolol 25mg BID and uptitrate as needed to achieve HR <110.   -CHADsVasc of at least 4. Ideally should be on anticoagulation given elevated stroke risk but given frequent falls (along with recent fall with head injury) risks and benefits should be discussed with patient and patient's family.  -Continue ASA for now.  -Obtain TTE given LE edema and elevated BNP  -Continue tele monitoring     Case discussed with Dr. Beltran.    Sarah Ruano MD PGY-5  Cardiology Fellow  All Cardiology service information can be found 24/7 on [email or web address], password: [email or web address]  Note is preliminary until signed by the attending.    
 75 y /o F with hx of Alzheimers, HTN, HLD, obesity, b/l cellulitis presents with a mechanical fall with nasal fracture and R scalp contusion. Cardiology c/s for new onset Afib.    #pAFib  -C/w metoprolol 50mg BID  -CHADsVasc of at least 4. Ideally should be on anticoagulation long term given elevated stroke risk but given frequent falls (along with recent fall with head injury) risks and benefits should be discussed with patient and patient's family. Defer A/C for now in the setting of acute head trauma   -Continue ASA for now.  -Await TTE  -Continue tele monitoring     Houston Lima PGY5  [phone]  All Cardiology service information can be found 24/7 on [email or web address], password: [email or web address]
74 yo female with PMH of dementia, HTN, HLD, morbid obesity who presents from her PCP office with recurrent falls and concerns for cellulitis refractory to bactrim found to have non-displaced nasal fracture admitted for concern for lower extremity cellulitis refractory to outpatient therapy. 
74 yo female with PMH of dementia, HTN, HLD, morbid obesity who presents from her PCP office for witnessed mechanical fall found to have non-displaced nasal fracture admitted for lower extremity edema and pulmonary edema concerning for CHF with course c/b new onset atrial fibrillation with RVR.
76 yo female with PMH of dementia, HTN, HLD, morbid obesity who presents from her PCP office for witnessed mechanical fall found to have non-displaced nasal fracture admitted for lower extremity edema and pulmonary edema concerning for CHF with course c/b new onset atrial fibrillation with RVR.
75 year old female PMH Dementia, HTN, HLD, Obesity who presents to [facility] on 5/5 following a fall. Reportedly being treated for "bilateral lower extremity cellulitis" prior to presentation.     U/A (5/5) with no pyuria.   COVID19 PCR (5/5) Negative.    MRSA/MSSA Nasal PCR negative    CXR (5/5) with Diffuse bilateral reticular opacities, likely pulmonary vascular congestion.   XR Tib-Fib (5/5) with No acute fracture or dislocation, Bilateral TKAs without complication. Bilateral lower leg edema.   CT A/P (5/5) with No CT evidence of acute visceral or osseous injury in the abdomen and pelvis.    At this point LE exam consistent with changes of chronic venous stasis  Patient also with no leukocytosis or fever on presentation  I would monitor off of antibiotics    Overall, Rash (LE erythema), Fall    I will be away over this upcoming weekend. Please contact the Infectious Diseases Office with any further questions or concerns.     Jimenez Mullins M.D.  [facility] Division of Infectious Disease  8AM-5PM: Pager Number [phone]  After Hours (or if no response): Please contact the Infectious Diseases Office at [phone] 
76 yo female with PMH of dementia, HTN, HLD, morbid obesity who presents from her PCP office for witnessed mechanical fall found to have non-displaced nasal fracture admitted for lower extremity edema and pulmonary edema concerning for heart failure along with new onset atrial fibrillation with RVR
74 yo female with PMH of dementia, HTN, HLD, morbid obesity who presents from her PCP office for witnessed mechanical fall found to have non-displaced nasal fracture admitted for lower extremity edema and pulmonary edema concerning for CHF with course c/b new onset atrial fibrillation with RVR.

## 2021-05-12 NOTE — DISCHARGE NOTE NURSING/CASE MANAGEMENT/SOCIAL WORK - PATIENT PORTAL LINK FT
You can access the FollowMyHealth Patient Portal offered by [facility] by registering at the following website: [email or web address]. By joining [facility]’s FollowMyHealth portal, you will also be able to view your health information using other applications (apps) compatible with our system.

## 2021-05-12 NOTE — PROGRESS NOTE ADULT - PROBLEM SELECTOR PLAN 3
microcytic anemia with unknown baseline. no signs/symptoms of active bleed (though has healing ecchymoses and contusion from her recent fall)  -iron and ferritin low, will start PO iron every other day
s/p mechanical fall, witnessed.   - non-displaced nasal fracture as above.  - rest of imaging with no other acute fracture/dislocation
Iron and ferritin low  Cont PO iron supplementation
microcytic anemia with unknown baseline. no signs/symptoms of active bleed (though has healing ecchymoses and contusion from her recent fall)  - f/u iron studies, b12, folate ordered for AM
Iron and ferritin low  Cont PO iron supplementation
Iron and ferritin low  Cont PO iron supplementation
microcytic anemia with unknown baseline. no signs/symptoms of active bleed (though has healing ecchymoses and contusion from her recent fall)  -iron and ferritin low, will start PO iron every other day

## 2021-05-12 NOTE — DISCHARGE NOTE PROVIDER - NSDCMRMEDTOKEN_GEN_ALL_CORE_FT
acetaminophen 325 mg oral tablet: 2 tab(s) orally every 6 hours, As needed, Mild Pain (1 - 3) or fever &gt;101F  aspirin 81 mg oral tablet: 1 tab(s) orally once a day  ezetimibe 10 mg oral tablet: 1 tab(s) orally once a day  famotidine 40 mg oral tablet: 1 tab(s) orally once a day  ferrous sulfate 325 mg (65 mg elemental iron) oral tablet: 1 tab(s) orally   furosemide 40 mg oral tablet: 1 tab(s) orally once a day  lovastatin 20 mg oral tablet: 1 tab(s) orally once a day  memantine 10 mg oral tablet: 1 tab(s) orally 2 times a day  metoprolol tartrate 50 mg oral tablet: 1 tab(s) orally every 12 hours  omeprazole 20 mg oral delayed release capsule: 1 cap(s) orally once a day  SEROquel 25 mg oral tablet: 0.5 tab(s) orally once a day (at bedtime), As Needed  sertraline 100 mg oral tablet: 1 tab(s) orally once a day  Vitamin B-12 1000 mcg oral tablet: 1 tab(s) orally once a day  Vitamin D3 5000 intl units (125 mcg) oral tablet: 1 tab(s) orally once a day  Zetia 10 mg oral tablet: 1 tab(s) orally once a day

## 2021-05-12 NOTE — PROGRESS NOTE ADULT - PROBLEM SELECTOR PROBLEM 3
Microcytic anemia
Fall, initial encounter
Microcytic anemia

## 2021-05-12 NOTE — PROGRESS NOTE ADULT - REASON FOR ADMISSION
S/P presumed mechanical fall

## 2021-05-12 NOTE — PROGRESS NOTE ADULT - PROBLEM SELECTOR PROBLEM 5
Hyperlipidemia
Hyperlipidemia
Fall, initial encounter
Fall, initial encounter
Hyperlipidemia
Essential hypertension
Fall, initial encounter

## 2021-05-12 NOTE — PROGRESS NOTE ADULT - PROBLEM SELECTOR PROBLEM 6
Dementia with behavioral disturbance, unspecified dementia type
Hyperlipidemia
Dementia with behavioral disturbance, unspecified dementia type
Essential hypertension
Dementia with behavioral disturbance, unspecified dementia type
Essential hypertension
Essential hypertension

## 2021-05-12 NOTE — PROGRESS NOTE ADULT - SUBJECTIVE AND OBJECTIVE BOX
Rubens Quevedo MD  Pager [phone]  Spectra [phone]  Cell Phone [phone]    Patient is a 75y old  Female who presents with a chief complaint of S/P presumed mechanical fall (12 May 2021 10:52)        SUBJECTIVE / OVERNIGHT EVENTS: No new events/complaints  TELEMETRY: SR 70-90's      MEDICATIONS  (STANDING):  aspirin enteric coated 81 milliGRAM(s) Oral daily  cholecalciferol 1000 Unit(s) Oral daily  cyanocobalamin 1000 MICROGram(s) Oral daily  enoxaparin Injectable 40 milliGRAM(s) SubCutaneous daily  ezetimibe 10 milliGRAM(s) Oral at bedtime  famotidine    Tablet 20 milliGRAM(s) Oral two times a day  ferrous    sulfate 325 milliGRAM(s) Oral <User Schedule>  furosemide    Tablet 40 milliGRAM(s) Oral daily  lovastatin 20 milliGRAM(s) Oral at bedtime  memantine 10 milliGRAM(s) Oral two times a day  metoprolol tartrate 50 milliGRAM(s) Oral every 12 hours  sertraline 100 milliGRAM(s) Oral daily    MEDICATIONS  (PRN):  acetaminophen   Tablet .. 650 milliGRAM(s) Oral every 6 hours PRN Temp greater or equal to 38C (100.4F), Mild Pain (1 - 3)  QUEtiapine 12.5 milliGRAM(s) Oral at bedtime PRN agitation or insomnia      Vital Signs Last 24 Hrs  T(C): 36.6 (12 May 2021 12:19), Max: 36.6 (12 May 2021 12:19)  T(F): 97.9 (12 May 2021 12:19), Max: 97.9 (12 May 2021 12:19)  HR: 78 (12 May 2021 12:19) (78 - 95)  BP: 122/90 (12 May 2021 12:19) (113/78 - 132/81)  BP(mean): --  RR: 16 (12 May 2021 12:19) (16 - 18)  SpO2: 96% (12 May 2021 12:19) (96% - 98%)  CAPILLARY BLOOD GLUCOSE        I&O's Summary    11 May 2021 07:01  -  12 May 2021 07:00  --------------------------------------------------------  IN: 360 mL / OUT: 1500 mL / NET: -1140 mL    12 May 2021 07:01  -  12 May 2021 13:51  --------------------------------------------------------  IN: 240 mL / OUT: 0 mL / NET: 240 mL          PHYSICAL EXAM  GENERAL: NAD, well-developed  HEAD:  R frontal hematoma with ecchymosis surrounding both eyes  EYES: EOMI, PERRLA, conjunctiva and sclera clear  CHEST/LUNG: Clear to auscultation anteriorly  HEART: +S1+S2, regular   ABDOMEN: Soft, Nontender, Nondistended; Obese; Bowel sounds present  EXTREMITIES:  2+ Peripheral Pulses, No clubbing, cyanosis, or edema  PSYCH: Whitney (person)      LABS:                        11.4   8.77  )-----------( 467      ( 12 May 2021 06:03 )             37.1     05-12    138  |  101  |  20  ----------------------------<  96  4.2   |  23  |  0.82    Ca    9.0      12 May 2021 06:02                  RADIOLOGY & ADDITIONAL TESTS:    Imaging Personally Reviewed:  Consultant(s) Notes Reviewed:    Care Discussed with Consultants/Other Providers:

## 2021-05-12 NOTE — PROGRESS NOTE ADULT - PROVIDER SPECIALTY LIST ADULT
Hospitalist
Infectious Disease
Cardiology
Cardiology
Hospitalist
Internal Medicine
Internal Medicine
Hospitalist
Internal Medicine
Hospitalist

## 2021-05-12 NOTE — PROGRESS NOTE ADULT - PROBLEM SELECTOR PLAN 7
- c/w lovastatin 20mg qHS  - c/w zetia 10mg qHS
- c/w lovastatin 20mg qHS  - c/w zetia 10mg qHS
PT recommending [facility] at discharge- awaiting placement
- c/w lovastatin 20mg qHS  - c/w zetia 10mg qHS
D/C to [facility] today  45 minutes spent discharging the patient
history of Alzheimer's dementia. baseline AOx 2  - holding namenda as a BEERS risk  - c/w sertraline 100mg daily  - c/w seroquel 12.5mg qHS PRN
PT recommending [facility] at discharge

## 2021-10-06 PROBLEM — I10 ESSENTIAL HYPERTENSION: Status: ACTIVE | Noted: 2017-09-14

## 2022-02-24 NOTE — ED ADULT NURSE NOTE - OBJECTIVE STATEMENT
pt into ED awake and alert with family at bedside and no acute distress noted at this time, pt states for two days she has had a gradual increase of swelling, redness, and pain to lower extremities. MD at bedside to assess patient, IV placed with all blood work sent to lab, and pt taken to radiology study and given IV antibiotics awaiting all test results initiation of breastfeeding/breast milk feeding

## 2022-06-15 NOTE — ED PROVIDER NOTE - TEMPLATE, MLM
General Complex Repair Preamble Text (Leave Blank If You Do Not Want): Extensive wide undermining was performed.

## 2022-11-29 NOTE — ED PROVIDER NOTE - WET READ LAUNCH FT
Patient called to schedule a follow up appointment on 2/3/2023.    Patient states she is out of the Cymbalta.    Writer explained the refill policy.   There are no Wet Read(s) to document.

## 2022-12-02 NOTE — PHYSICAL THERAPY INITIAL EVALUATION ADULT - WEIGHT-BEARING RESTRICTIONS: GAIT, REHAB EVAL
Admission Reconciliation is Completed  Discharge Reconciliation is Not Complete full weight-bearing Admission Reconciliation is Completed  Discharge Reconciliation is Completed

## 2024-09-20 NOTE — ED ADULT NURSE NOTE - DOES PATIENT HAVE ADVANCE DIRECTIVE
Refill Decision Note   Gilberto Bueno  is requesting a refill authorization.  Brief Assessment and Rationale for Refill:  Approve     Medication Therapy Plan:         Comments:     Note composed:11:26 AM 09/20/2024               
unk